# Patient Record
Sex: MALE | Race: WHITE | NOT HISPANIC OR LATINO | Employment: FULL TIME | URBAN - NONMETROPOLITAN AREA
[De-identification: names, ages, dates, MRNs, and addresses within clinical notes are randomized per-mention and may not be internally consistent; named-entity substitution may affect disease eponyms.]

---

## 2017-12-20 ENCOUNTER — TRANSCRIBE ORDERS (OUTPATIENT)
Dept: ORTHOPEDIC SURGERY | Facility: CLINIC | Age: 57
End: 2017-12-20

## 2017-12-20 DIAGNOSIS — M25.562 LEFT KNEE PAIN, UNSPECIFIED CHRONICITY: Primary | ICD-10-CM

## 2017-12-21 DIAGNOSIS — M25.562 LEFT KNEE PAIN, UNSPECIFIED CHRONICITY: Primary | ICD-10-CM

## 2017-12-22 ENCOUNTER — OFFICE VISIT (OUTPATIENT)
Dept: ORTHOPEDIC SURGERY | Facility: CLINIC | Age: 57
End: 2017-12-22

## 2017-12-22 VITALS — WEIGHT: 201 LBS | BODY MASS INDEX: 29.77 KG/M2 | HEIGHT: 69 IN

## 2017-12-22 DIAGNOSIS — G89.29 CHRONIC PAIN OF LEFT KNEE: Primary | ICD-10-CM

## 2017-12-22 DIAGNOSIS — M17.12 PRIMARY OSTEOARTHRITIS OF LEFT KNEE: ICD-10-CM

## 2017-12-22 DIAGNOSIS — M25.562 CHRONIC PAIN OF LEFT KNEE: Primary | ICD-10-CM

## 2017-12-22 PROCEDURE — 99214 OFFICE O/P EST MOD 30 MIN: CPT | Performed by: NURSE PRACTITIONER

## 2017-12-22 PROCEDURE — 20610 DRAIN/INJ JOINT/BURSA W/O US: CPT | Performed by: NURSE PRACTITIONER

## 2017-12-22 RX ORDER — MELOXICAM 15 MG/1
15 TABLET ORAL DAILY
Qty: 30 TABLET | Refills: 1 | Status: SHIPPED | OUTPATIENT
Start: 2017-12-22 | End: 2018-02-13 | Stop reason: SDUPTHER

## 2017-12-22 RX ADMIN — LIDOCAINE HYDROCHLORIDE 2 ML: 20 INJECTION, SOLUTION INFILTRATION; PERINEURAL at 13:47

## 2017-12-22 RX ADMIN — TRIAMCINOLONE ACETONIDE 40 MG: 40 INJECTION, SUSPENSION INTRA-ARTICULAR; INTRAMUSCULAR at 13:47

## 2017-12-22 NOTE — PROGRESS NOTES
"Vincent Mary is a 57 y.o. male   Primary provider:  Candido Bourgeois MD       Chief Complaint   Patient presents with   • Left Knee - Pain       HISTORY OF PRESENT ILLNESS:    HPI Comments: Patient complains of chronic left knee pain for several years that has continued to gradually worsen.  Patient was seen by his primary care physician, Dr. Bourgeois, on 9/19/2017 with x-rays done and an IM injection of steroid given.  Patient reports that the steroid relieved his pain temporarily but the pain has come back.  Denies any previous injury to his knee. Pain is described as intermittent and moderate in severity.  Pain is described as stabbing and aching in nature.  Pain is worse with flexion of his knee, weightbearing, standing and walking.  Pain improves mildly with rest and Ibuprofen.    Pain   This is a chronic problem. The current episode started more than 1 year ago. The problem occurs constantly. The problem has been gradually worsening. Associated symptoms comments: Stabbing, aching. . The symptoms are aggravated by walking and standing. Treatments tried: IM injection.  The treatment provided mild relief.        CONCURRENT MEDICAL HISTORY:    Past Medical History:   Diagnosis Date   • Arthritis    • Hypertension    • Injury of back    • Kidney stone        Allergies   Allergen Reactions   • Percocet [Oxycodone-Acetaminophen]      Patient stated he \"broke out in a sweat\" when given this medication.          Current Outpatient Prescriptions:   •  Cholecalciferol (VITAMIN D3) 5000 UNITS capsule capsule, Take 5,000 Units by mouth Daily., Disp: , Rfl:   •  meloxicam (MOBIC) 15 MG tablet, Take 1 tablet by mouth Daily for 30 days., Disp: 30 tablet, Rfl: 1  •  NIFEdipine XL (PROCARDIA XL) 30 MG 24 hr tablet, Take 30 mg by mouth Daily., Disp: , Rfl:     History reviewed. No pertinent surgical history.    Family History   Problem Relation Age of Onset   • Rheum arthritis Mother    • Heart failure Father    • " "Hypertension Father    • No Known Problems Sister    • No Known Problems Brother    • No Known Problems Son    • No Known Problems Daughter        Social History     Social History   • Marital status:      Spouse name: N/A   • Number of children: N/A   • Years of education: N/A     Occupational History   • Not on file.     Social History Main Topics   • Smoking status: Never Smoker   • Smokeless tobacco: Never Used   • Alcohol use No   • Drug use: No   • Sexual activity: Defer     Other Topics Concern   • Not on file     Social History Narrative        Review of Systems   HENT:        Ringing in ears   Eyes: Positive for visual disturbance.   Musculoskeletal: Positive for gait problem.        Left knee pain.        PHYSICAL EXAMINATION:       Ht 175.3 cm (69\")  Wt 91.2 kg (201 lb)  BMI 29.68 kg/m2    Physical Exam   Constitutional: He is oriented to person, place, and time. Vital signs are normal. He appears well-developed and well-nourished.   HENT:   Head: Normocephalic.   Pulmonary/Chest: Effort normal. No respiratory distress.   Abdominal: Soft. He exhibits no distension.   Musculoskeletal:        Left knee: He exhibits no effusion.   Neurological: He is alert and oriented to person, place, and time. GCS eye subscore is 4. GCS verbal subscore is 5. GCS motor subscore is 6.   Skin: Skin is warm, dry and intact.   Psychiatric: He has a normal mood and affect. His speech is normal and behavior is normal. Judgment and thought content normal. Cognition and memory are normal.   Vitals reviewed.      GAIT:     []  Normal  [x]  Antalgic    Assistive device: [x]  None  []  Walker     []  Crutches  []  Cane     []  Wheelchair  []  Stretcher    Right Knee Exam     Tenderness   The patient is experiencing no tenderness.         Range of Motion   Extension: 0   Flexion: 120     Muscle Strength     The patient has normal right knee strength.    Tests   Mini:  Medial - negative Lateral - negative  Varus: " negative  Valgus: negative    Other   Erythema: absent  Sensation: normal  Pulse: present  Swelling: none      Left Knee Exam     Tenderness   The patient is experiencing tenderness in the medial joint line.    Range of Motion   Extension: 0   Flexion: 120     Muscle Strength     The patient has normal left knee strength.    Tests   Mini:  Medial - negative Lateral - negative  Varus: negative  Valgus: negative    Other   Erythema: absent  Sensation: normal  Pulse: present  Swelling: none  Effusion: no effusion present    Comments:  Pain and crepitus with range of motion.             Left knee X-rays done at [facility] on  9/19/2017     Findings: There is joint space narrowing in the medial compartment with minimal osteophyte formation along the medial tibial plateau.  No joint effusion is noted.  There are no fractures.  Visualized joints are well aligned.    Impression:   Mild changes of osteoarthritis in the medial compartment with no acute bony abnormality.       Large Joint Arthrocentesis  Date/Time: 12/22/2017 1:47 PM  Consent given by: patient  Site marked: site marked  Timeout: Immediately prior to procedure a time out was called to verify the correct patient, procedure, equipment, support staff and site/side marked as required   Supporting Documentation  Indications: pain   Procedure Details  Location: knee - L knee  Preparation: Patient was prepped and draped in the usual sterile fashion  Needle size: 22 G  Approach: anterolateral  Medications administered: 2 mL lidocaine 2%; 40 mg triamcinolone acetonide 40 MG/ML  Patient tolerance: patient tolerated the procedure well with no immediate complications        ASSESSMENT:    Diagnoses and all orders for this visit:    Chronic pain of left knee  -     Large Joint Arthrocentesis    Primary osteoarthritis of left knee  -     Large Joint Arthrocentesis    Other orders  -     meloxicam (MOBIC) 15 MG tablet; Take 1 tablet by mouth Daily for  30 days.      PLAN    X-rays of left knee done at [facility] on 9/19/2017 are reviewed today.  Discussed findings of osteoarthritis and medial joint space narrowing on x-ray.  Patient has had ongoing left knee pain for several years that has gradually worsened.  Recommend intra-articular injection of steroid to the left knee today for pain.  Recommend starting an oral NSAID for arthritic pain.  Meloxicam is prescribed today.  Discussed further conservative treatment options, including Viscosupplementation injections, physical therapy and bracing.  Recommend modified activity and modified weight-bearing as tolerated based on his pain.  Recommend ice therapy intermittently as needed for pain/swelling.  Follow-up in 4 weeks for recheck.  Patient will notify the office if he decides he would like the viscosupplementation injections.    Return in about 4 weeks (around 1/19/2018) for Recheck.      This document has been electronically signed by ANTOINE Shannon on December 26, 2017 4:50 PM      ANTOINE Shannon

## 2017-12-26 RX ORDER — LIDOCAINE HYDROCHLORIDE 20 MG/ML
2 INJECTION, SOLUTION INFILTRATION; PERINEURAL
Status: COMPLETED | OUTPATIENT
Start: 2017-12-22 | End: 2017-12-22

## 2017-12-26 RX ORDER — TRIAMCINOLONE ACETONIDE 40 MG/ML
40 INJECTION, SUSPENSION INTRA-ARTICULAR; INTRAMUSCULAR
Status: COMPLETED | OUTPATIENT
Start: 2017-12-22 | End: 2017-12-22

## 2018-01-19 ENCOUNTER — OFFICE VISIT (OUTPATIENT)
Dept: ORTHOPEDIC SURGERY | Facility: CLINIC | Age: 58
End: 2018-01-19

## 2018-01-19 VITALS — WEIGHT: 202.9 LBS | HEIGHT: 69 IN | BODY MASS INDEX: 30.05 KG/M2

## 2018-01-19 DIAGNOSIS — G89.29 CHRONIC MIDLINE THORACIC BACK PAIN: ICD-10-CM

## 2018-01-19 DIAGNOSIS — M54.50 CHRONIC MIDLINE LOW BACK PAIN WITHOUT SCIATICA: ICD-10-CM

## 2018-01-19 DIAGNOSIS — R07.81 RIB PAIN ON LEFT SIDE: ICD-10-CM

## 2018-01-19 DIAGNOSIS — G89.29 CHRONIC MIDLINE LOW BACK PAIN WITHOUT SCIATICA: ICD-10-CM

## 2018-01-19 DIAGNOSIS — M17.12 PRIMARY OSTEOARTHRITIS OF LEFT KNEE: ICD-10-CM

## 2018-01-19 DIAGNOSIS — G89.29 CHRONIC PAIN OF LEFT KNEE: Primary | ICD-10-CM

## 2018-01-19 DIAGNOSIS — M25.562 CHRONIC PAIN OF LEFT KNEE: Primary | ICD-10-CM

## 2018-01-19 DIAGNOSIS — M54.6 CHRONIC MIDLINE THORACIC BACK PAIN: ICD-10-CM

## 2018-01-19 PROCEDURE — 99214 OFFICE O/P EST MOD 30 MIN: CPT | Performed by: NURSE PRACTITIONER
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
## 2018-01-22 PROBLEM — G89.29 CHRONIC MIDLINE THORACIC BACK PAIN: Status: ACTIVE | Noted: 2018-01-22

## 2018-01-22 PROBLEM — R07.81 RIB PAIN ON LEFT SIDE: Status: ACTIVE | Noted: 2018-01-22

## 2018-01-22 PROBLEM — G89.29 CHRONIC MIDLINE LOW BACK PAIN WITHOUT SCIATICA: Status: ACTIVE | Noted: 2018-01-22

## 2018-01-22 PROBLEM — M54.50 CHRONIC MIDLINE LOW BACK PAIN WITHOUT SCIATICA: Status: ACTIVE | Noted: 2018-01-22

## 2018-01-22 PROBLEM — M54.6 CHRONIC MIDLINE THORACIC BACK PAIN: Status: ACTIVE | Noted: 2018-01-22

## 2018-01-29 ENCOUNTER — APPOINTMENT (OUTPATIENT)
Dept: MRI IMAGING | Facility: HOSPITAL | Age: 58
End: 2018-01-29

## 2018-02-01 ENCOUNTER — HOSPITAL ENCOUNTER (OUTPATIENT)
Dept: MRI IMAGING | Facility: HOSPITAL | Age: 58
Discharge: HOME | End: 2018-02-01
Admitting: NURSE PRACTITIONER

## 2018-02-01 ENCOUNTER — HOSPITAL ENCOUNTER (OUTPATIENT)
Dept: MRI IMAGING | Facility: HOSPITAL | Age: 58
Discharge: HOME | End: 2018-02-01

## 2018-02-01 DIAGNOSIS — M54.6 CHRONIC MIDLINE THORACIC BACK PAIN: ICD-10-CM

## 2018-02-01 DIAGNOSIS — M54.50 CHRONIC MIDLINE LOW BACK PAIN WITHOUT SCIATICA: ICD-10-CM

## 2018-02-01 DIAGNOSIS — G89.29 CHRONIC MIDLINE LOW BACK PAIN WITHOUT SCIATICA: ICD-10-CM

## 2018-02-01 DIAGNOSIS — G89.29 CHRONIC MIDLINE THORACIC BACK PAIN: ICD-10-CM

## 2018-02-01 PROCEDURE — 72146 MRI CHEST SPINE W/O DYE: CPT

## 2018-02-01 PROCEDURE — 72148 MRI LUMBAR SPINE W/O DYE: CPT

## 2018-02-02 ENCOUNTER — OFFICE VISIT (OUTPATIENT)
Dept: ORTHOPEDIC SURGERY | Facility: CLINIC | Age: 58
End: 2018-02-02

## 2018-02-02 VITALS — HEIGHT: 69 IN | WEIGHT: 201.3 LBS | BODY MASS INDEX: 29.82 KG/M2

## 2018-02-02 DIAGNOSIS — R07.81 RIB PAIN ON LEFT SIDE: ICD-10-CM

## 2018-02-02 DIAGNOSIS — G89.29 CHRONIC PAIN OF LEFT KNEE: Primary | ICD-10-CM

## 2018-02-02 DIAGNOSIS — M54.50 CHRONIC MIDLINE LOW BACK PAIN WITHOUT SCIATICA: ICD-10-CM

## 2018-02-02 DIAGNOSIS — G89.29 CHRONIC MIDLINE THORACIC BACK PAIN: ICD-10-CM

## 2018-02-02 DIAGNOSIS — M17.12 PRIMARY OSTEOARTHRITIS OF LEFT KNEE: ICD-10-CM

## 2018-02-02 DIAGNOSIS — G89.29 CHRONIC MIDLINE LOW BACK PAIN WITHOUT SCIATICA: ICD-10-CM

## 2018-02-02 DIAGNOSIS — M54.6 CHRONIC MIDLINE THORACIC BACK PAIN: ICD-10-CM

## 2018-02-02 DIAGNOSIS — M25.562 CHRONIC PAIN OF LEFT KNEE: Primary | ICD-10-CM

## 2018-02-02 PROCEDURE — 20610 DRAIN/INJ JOINT/BURSA W/O US: CPT | Performed by: NURSE PRACTITIONER

## 2018-02-02 PROCEDURE — 99214 OFFICE O/P EST MOD 30 MIN: CPT | Performed by: NURSE PRACTITIONER

## 2018-02-02 NOTE — PROGRESS NOTES
"Vincent Mary is a 57 y.o. male returns for     Chief Complaint   Patient presents with   • Left Knee - Follow-up     Orthovisc #1     • Lumbar Spine - Follow-up     MRI results from 2/1/18   • Thoracic Spine - Follow-up     MRI results from 2/1/18       HISTORY OF PRESENT ILLNESS: Patient presents to office for follow-up of chronic lumbar and thoracic back pain and MRI results of lumbar spine and thoracic spine.  Complaints of back pain are unchanged from prior visit on 1/19/2018.  Patient is also here for his first Orthovisc injection of the left knee for osteoarthritis.    CONCURRENT MEDICAL HISTORY:    Past Medical History:   Diagnosis Date   • Arthritis    • Hypertension    • Injury of back    • Kidney stone        Allergies   Allergen Reactions   • Percocet [Oxycodone-Acetaminophen]      Patient stated he \"broke out in a sweat\" when given this medication.          Current Outpatient Prescriptions:   •  Cholecalciferol (VITAMIN D3) 5000 UNITS capsule capsule, Take 5,000 Units by mouth Daily., Disp: , Rfl:   •  NIFEdipine XL (PROCARDIA XL) 30 MG 24 hr tablet, Take 30 mg by mouth Daily., Disp: , Rfl:     No past surgical history on file.    ROS  No fevers or chills.  No chest pain or shortness of air.  No GI or  disturbances. Left knee pain. Back pain.     PHYSICAL EXAMINATION:       Ht 175.3 cm (69\")  Wt 91.3 kg (201 lb 4.8 oz)  BMI 29.73 kg/m2    Physical Exam   Constitutional: He is oriented to person, place, and time. Vital signs are normal. He appears well-developed and well-nourished.   HENT:   Head: Normocephalic.   Pulmonary/Chest: Effort normal. No respiratory distress.   Abdominal: Soft. He exhibits no distension.   Musculoskeletal:        Right knee: He exhibits no effusion.        Left knee: He exhibits no effusion.   Neurological: He is alert and oriented to person, place, and time. GCS eye subscore is 4. GCS verbal subscore is 5. GCS motor subscore is 6.   Skin: Skin is warm, dry and " intact.   Psychiatric: He has a normal mood and affect. His speech is normal and behavior is normal. Judgment and thought content normal. Cognition and memory are normal.   Vitals reviewed.      GAIT:     [x]  Normal  []  Antalgic    Assistive device: [x]  None  []  Walker     []  Crutches  []  Cane     []  Wheelchair  []  Stretcher    Right Knee Exam     Tenderness   The patient is experiencing no tenderness.         Range of Motion   Extension: 0   Flexion: 120     Muscle Strength     The patient has normal right knee strength.    Tests   Mini:  Medial - negative Lateral - negative  Varus: negative  Valgus: negative    Other   Erythema: absent  Sensation: normal  Swelling: none  Other tests: no effusion present    Comments:  No pain with range of motion.      Left Knee Exam     Tenderness   The patient is experiencing tenderness in the medial joint line (mild).    Range of Motion   Extension: 0   Flexion: 120     Muscle Strength     The patient has normal left knee strength.    Tests   Mini:  Medial - negative Lateral - negative  Varus: negative  Valgus: negative    Other   Erythema: absent  Sensation: normal  Pulse: present  Swelling: none  Effusion: no effusion present    Comments:  No pain with range of motion.       Back Exam     Tenderness   The patient is experiencing tenderness in the lumbar and thoracic (mild).    Range of Motion   The patient has normal back ROM.    Muscle Strength   The patient has normal back strength.    Tests   Straight leg raise right: negative  Straight leg raise left: negative    Other   Sensation: normal  Gait: normal             Xr Ribs Left With Pa Chest    Result Date: 1/19/2018  Narrative: AP view of the chest and 4 views of the left ribs reveal no evidence of fracture.  No significant degenerative changes are noted.  No acute radiologic abnormalities are noted at this time.01/19/18 at 5:34 PM by ANTOINE Shannon     Xr Spine Thoracic 2 View    Result Date:  1/22/2018  Narrative: 3 views of the thoracic spine reveal degenerative changes with decreased vertebral disc spacing at multiple levels throughout the thoracic spine. There is a chronic compression deformity of the T6 vertebral body.  No evidence of acute fracture.  No acute radiologic abnormalities are noted at this time. 01/22/18 at 3:29 PM by ANTOINE Shannon     Xr Spine Lumbar 2 Or 3 View    Result Date: 1/19/2018  Narrative: 2 views of the lumbar spine reveal degenerative changes with decreased vertebral disc spacing noted at L1-L2, T12-L1 and T11-T12.  There are multiple osteophyte formations noted to the anterior spine with the largest osteophytes noted anteriorly at L2 and L3.  No evidence of fracture.  No acute radiologic abnormalities are noted at this time.01/19/18 at 5:20 PM by ANTOINE Shannon     Mri Thoracic Spine Without Contrast    Result Date: 2/1/2018  Narrative: Mid back pain. MR, thoracic spine without intravenous contrast. Comparison is made with study dated March 14, 2006. Examination revealed mild kyphotic deformity of the thoracic spine. The alignment is well maintained. No subluxation is seen. Again noted old compression fracture of T6 vertebral body which lost approximately 20% of its height. There is also very minimal loss of height of T8 vertebral body. There are multiple endplate irregularities. There are large hemangioma is of T2 vertebra on the right, T5 vertebra on the left and T11 vertebra the left. There are also several small hemangiomas of thoracic vertebra. No bone marrow replacement is identified. At T5-T6 there is mild disc bulge/osteophyte complex without cord effacement. No abnormal signal is seen within the spinal cord.     Impression: CONCLUSION: No significant interval change. Stable mild compression fracture of T6 vertebral body and very minimal loss of height of T8 vertebral body. Mild degenerative changes. Electronically signed by:  Jose Hardy MD   2/1/2018 10:55 AM CST Workstation: [id number]    Mri Lumbar Spine Without Contrast    Result Date: 2/1/2018  Narrative: Procedure: MR lumbar spine without contrast Reason for exam: Abnormal x-ray, lumbar spine, DJD. Chronic mid lower back pain. FINDINGS: Multisequence multiplanar MR imaging of the lumbar spine was performed without contrast. The lumbar spine vertebral body heights and alignment are normal. There is no evidence of fracture or dislocation. L4 and L3 small circular subcentimeter high signal lesions on T1 and T2 weighting consistent with benign hemangiomas. No abnormal marrow signal. The conus of the spinal cord appears normal with tip at the T12 vertebral body level. T12-L1: Disc space normal. No disc protrusion or extrusion. Nerve roots exit without compromise. L1-L2: Disc space normal. No disc protrusion or extrusion. Nerve roots exit without compromise. L2-L3: Diffuse disc mildly low signal on T2 weighting suggesting mild desiccation from degenerative disc disease. No disc protrusion or extrusion. Nerve roots exit without compromise. L3-L4: Disc space normal. No disc protrusion or extrusion. Nerve roots exit without compromise. L4-L5: Disc space normal. No disc protrusion or extrusion. Nerve roots exit without compromise. L5-S1: Disc space normal. No disc protrusion or extrusion. Nerve roots exit without compromise.     Impression: 1.  No acute MR lumbar spine abnormality. 2.  L2-L3 mild degenerative disc disease. Electronically signed by:  Sylvester Hudson MD  2/1/2018 12:07 PM [facility] Workstation: [id number]        Large Joint Arthrocentesis  Date/Time: 2/2/2018 9:56 AM  Consent given by: patient  Site marked: site marked  Timeout: Immediately prior to procedure a time out was called to verify the correct patient, procedure, equipment, support staff and site/side marked as required   Supporting Documentation  Indications: pain   Procedure Details  Location: knee - L knee  Preparation: Patient was prepped  and draped in the usual sterile fashion  Needle size: 22 G  Approach: lateral  Medications administered: 30 mg Hyaluronan 30 MG/2ML  Patient tolerance: patient tolerated the procedure well with no immediate complications            ASSESSMENT:    Diagnoses and all orders for this visit:    Chronic pain of left knee  -     Large Joint Arthrocentesis    Primary osteoarthritis of left knee  -     Large Joint Arthrocentesis    Chronic midline low back pain without sciatica    Rib pain on left side    Chronic midline thoracic back pain      PLAN    MRI images of thoracic spine and lumbar spine are reviewed and results discussed with patient.  Discussed findings of multiple hemangiomas noted in thoracic spine with a largest hemangiomas being at T2, T5 and T11. These are described as benign hemangiomas per radiology reading and do not appear to take up abnormal signal on MRI images reviewed. However, recommend bone scan and labs to further rule out malignancy.  Patient is requesting to do his lab work at his work site at [employer].  Written orders are provided to patient today to take with him for labs.  Patient is aware that if they are unable to perform the labs at his work to let me know and we will enter the orders at [facility].  Chronic thoracic and lumbar spine pain are described as mild and chronic.  Patient describes increased pain and fatigue in the back with longer periods of standing and sitting.  Discussed with patient recommendation for physical therapy for back strengthening and conditioning.  Patient declines physical therapy at this time and wants to wait for the labs and bone scan reports.  Left knee pain related to osteoarthritis is described as mild today.  First Orthovisc injection given today.  Follow-up in one week for repeat Orthovisc injection and lab results.    Return in about 1 week (around 2/9/2018) for for Orthovisc injection #2.      This document has been electronically signed by Leila YAN  ANTOINE Barbosa on February 2, 2018 11:35 AM      ANTOINE Shannon

## 2018-02-09 ENCOUNTER — CLINICAL SUPPORT (OUTPATIENT)
Dept: ORTHOPEDIC SURGERY | Facility: CLINIC | Age: 58
End: 2018-02-09

## 2018-02-09 DIAGNOSIS — G89.29 CHRONIC MIDLINE LOW BACK PAIN WITHOUT SCIATICA: ICD-10-CM

## 2018-02-09 DIAGNOSIS — G89.29 CHRONIC MIDLINE THORACIC BACK PAIN: ICD-10-CM

## 2018-02-09 DIAGNOSIS — G89.29 CHRONIC MIDLINE LOW BACK PAIN WITH LEFT-SIDED SCIATICA: ICD-10-CM

## 2018-02-09 DIAGNOSIS — M54.50 CHRONIC MIDLINE LOW BACK PAIN WITHOUT SCIATICA: ICD-10-CM

## 2018-02-09 DIAGNOSIS — G89.29 CHRONIC PAIN OF LEFT KNEE: Primary | ICD-10-CM

## 2018-02-09 DIAGNOSIS — M54.6 CHRONIC MIDLINE THORACIC BACK PAIN: ICD-10-CM

## 2018-02-09 DIAGNOSIS — M17.12 PRIMARY OSTEOARTHRITIS OF LEFT KNEE: ICD-10-CM

## 2018-02-09 DIAGNOSIS — D18.09 HEMANGIOMA OF SPINE: ICD-10-CM

## 2018-02-09 DIAGNOSIS — M54.42 CHRONIC MIDLINE LOW BACK PAIN WITH LEFT-SIDED SCIATICA: ICD-10-CM

## 2018-02-09 DIAGNOSIS — M25.562 CHRONIC PAIN OF LEFT KNEE: Primary | ICD-10-CM

## 2018-02-09 PROCEDURE — 20610 DRAIN/INJ JOINT/BURSA W/O US: CPT | Performed by: NURSE PRACTITIONER

## 2018-02-09 RX ORDER — TRAMADOL HYDROCHLORIDE 50 MG/1
50 TABLET ORAL EVERY 8 HOURS PRN
Qty: 40 TABLET | Refills: 0
Start: 2018-02-09 | End: 2018-02-19

## 2018-02-09 NOTE — PROGRESS NOTES
Knee Joint Injection      Patient: Vincent Mary    YOB: 1960    Chief Complaint   Patient presents with   • Left Knee - Injections       History of Present Illness:  Patient is here for a left knee injection. Continues to have left knee pain. Continues to complain of low back pain that radiates to his left hip. States his back pain and left hip pain wake him up several times throughout the night.     Physical Exam: 57 y.o. male  General Appearance:    Alert, cooperative, in no acute distress                   There were no vitals filed for this visit.     Patient is alert and oriented, ×3 no acute distress.  Affect is normal respiratory rate is normal unlabored.  Exam and complaints are unchanged.    Procedure:  Large Joint Arthrocentesis  Date/Time: 2/9/2018 3:57 PM  Consent given by: patient  Site marked: site marked  Timeout: Immediately prior to procedure a time out was called to verify the correct patient, procedure, equipment, support staff and site/side marked as required   Supporting Documentation  Indications: pain   Procedure Details  Location: knee - L knee  Preparation: Patient was prepped and draped in the usual sterile fashion  Needle size: 22 G  Approach: anterolateral  Medications administered: 30 mg Hyaluronan 30 MG/2ML  Patient tolerance: patient tolerated the procedure well with no immediate complications        Assessment:    Diagnoses and all orders for this visit:    Chronic pain of left knee  -     Large Joint Arthrocentesis    Primary osteoarthritis of left knee  -     Large Joint Arthrocentesis    Hemangioma of spine  -     NM Bone Scan Multiple; Future    Chronic midline thoracic back pain  -     NM Bone Scan Multiple; Future    Chronic midline low back pain without sciatica  -     NM Bone Scan Multiple; Future    Chronic midline low back pain with left-sided sciatica    Other orders  -     traMADol (ULTRAM) 50 MG tablet; Take 1 tablet by mouth Every 8 (Eight) Hours As  Needed for Moderate Pain  for up to 10 days.      Plan:   Slowly increase activity as tolerated.  Discussed importance of leg strengthening and general conditioning.  Discussed warning signs of injection.  Discussed that purpose of injections was symptom improvement and improved activity.  Also discussed that further treatment options depended on symptoms at followup and length of time of improvement after injections. Patient complains of continued low back pain with pain that radiates to his left hip and leg.  Patient reports that this pain awakens him from sleep several times throughout the night.  Tramadol is prescribed today for pain.  Prescription per ANTOINE Cid.  Labs and bone scan pending.       This document has been electronically signed by ANTOINE Shannon on February 9, 2018 4:22 PM      Return in about 1 week (around 2/16/2018) for for Orthovisc #3.      This document has been electronically signed by ANTOINE Shannon on February 9, 2018 4:22 PM      ANTOINE Shannon

## 2018-02-13 RX ORDER — MELOXICAM 15 MG/1
TABLET ORAL
Qty: 30 TABLET | Refills: 1 | Status: SHIPPED | OUTPATIENT
Start: 2018-02-13 | End: 2018-04-09 | Stop reason: SDUPTHER

## 2018-02-16 ENCOUNTER — CLINICAL SUPPORT (OUTPATIENT)
Dept: ORTHOPEDIC SURGERY | Facility: CLINIC | Age: 58
End: 2018-02-16

## 2018-02-16 VITALS — WEIGHT: 201 LBS | HEIGHT: 69 IN | BODY MASS INDEX: 29.77 KG/M2

## 2018-02-16 DIAGNOSIS — M17.12 PRIMARY OSTEOARTHRITIS OF LEFT KNEE: ICD-10-CM

## 2018-02-16 DIAGNOSIS — M25.562 CHRONIC PAIN OF LEFT KNEE: Primary | ICD-10-CM

## 2018-02-16 DIAGNOSIS — G89.29 CHRONIC PAIN OF LEFT KNEE: Primary | ICD-10-CM

## 2018-02-16 PROCEDURE — 20610 DRAIN/INJ JOINT/BURSA W/O US: CPT | Performed by: NURSE PRACTITIONER

## 2018-02-16 NOTE — PROGRESS NOTES
"Knee Joint Injection      Patient: Vincent Mary    YOB: 1960    Chief Complaint   Patient presents with   • Left Knee - Injections     #3       History of Present Illness:  Patient is here for Orthovisc injection to left knee.  No new complaints.    Physical Exam: 57 y.o. male  General Appearance:    Alert, cooperative, in no acute distress                   Vitals:    02/16/18 1529   Weight: 91.2 kg (201 lb)   Height: 175.3 cm (69\")        Patient is alert and oriented ×3. No acute distress.  Affect is normal respiratory rate is normal unlabored.  Exam and complaints are unchanged. Patient continues to complain of chronic back pain and left knee pain. Copies of lab reports provided to patient today. Bone scan is scheduled.     Procedure:  Large Joint Arthrocentesis  Date/Time: 2/16/2018 3:30 PM  Consent given by: patient  Site marked: site marked  Timeout: Immediately prior to procedure a time out was called to verify the correct patient, procedure, equipment, support staff and site/side marked as required   Supporting Documentation  Indications: pain   Procedure Details  Location: knee - L knee  Preparation: Patient was prepped and draped in the usual sterile fashion  Needle size: 22 G  Approach: anterolateral  Medications administered: 30 mg Hyaluronan 30 MG/2ML  Patient tolerance: patient tolerated the procedure well with no immediate complications        Assessment:    Diagnoses and all orders for this visit:    Chronic pain of left knee    Primary osteoarthritis of left knee  -     Large Joint Arthrocentesis      Plan:   Slowly increase activity as tolerated.  Discussed importance of leg strengthening and general conditioning.  Discussed warning signs of injection.  Discussed that purpose of injections was symptom improvement and improved activity.  Also discussed that further treatment options depended on symptoms at followup and length of time of improvement after injections.    Return " in about 1 week (around 2/23/2018), or for Orthovisc #4.      This document has been electronically signed by ANTOINE Shannon on February 16, 2018 4:02 PM      ANTOINE Shannon

## 2018-02-20 ENCOUNTER — HOSPITAL ENCOUNTER (OUTPATIENT)
Dept: NUCLEAR MEDICINE | Facility: HOSPITAL | Age: 58
Discharge: HOME | End: 2018-02-20

## 2018-02-20 DIAGNOSIS — G89.29 CHRONIC MIDLINE LOW BACK PAIN WITHOUT SCIATICA: ICD-10-CM

## 2018-02-20 DIAGNOSIS — M54.6 CHRONIC MIDLINE THORACIC BACK PAIN: ICD-10-CM

## 2018-02-20 DIAGNOSIS — G89.29 CHRONIC MIDLINE THORACIC BACK PAIN: ICD-10-CM

## 2018-02-20 DIAGNOSIS — D18.09 HEMANGIOMA OF SPINE: ICD-10-CM

## 2018-02-20 DIAGNOSIS — M54.50 CHRONIC MIDLINE LOW BACK PAIN WITHOUT SCIATICA: ICD-10-CM

## 2018-02-20 DIAGNOSIS — M25.569 KNEE PAIN: ICD-10-CM

## 2018-02-20 PROCEDURE — 78306 BONE IMAGING WHOLE BODY: CPT

## 2018-02-20 PROCEDURE — A9503 TC99M MEDRONATE: HCPCS | Performed by: NURSE PRACTITIONER

## 2018-02-20 PROCEDURE — 0 TECHNETIUM MEDRONATE KIT: Performed by: NURSE PRACTITIONER

## 2018-02-20 RX ORDER — TC 99M MEDRONATE 20 MG/10ML
25.9 INJECTION, POWDER, LYOPHILIZED, FOR SOLUTION INTRAVENOUS
Status: COMPLETED | OUTPATIENT
Start: 2018-02-20 | End: 2018-02-20

## 2018-02-20 RX ADMIN — Medication 25.9 MILLICURIE: at 09:46

## 2018-02-23 ENCOUNTER — CLINICAL SUPPORT (OUTPATIENT)
Dept: ORTHOPEDIC SURGERY | Facility: CLINIC | Age: 58
End: 2018-02-23

## 2018-02-23 DIAGNOSIS — M17.12 PRIMARY OSTEOARTHRITIS OF LEFT KNEE: ICD-10-CM

## 2018-02-23 DIAGNOSIS — M25.562 CHRONIC PAIN OF LEFT KNEE: Primary | ICD-10-CM

## 2018-02-23 DIAGNOSIS — G89.29 CHRONIC PAIN OF LEFT KNEE: Primary | ICD-10-CM

## 2018-02-23 PROCEDURE — 20610 DRAIN/INJ JOINT/BURSA W/O US: CPT | Performed by: NURSE PRACTITIONER

## 2018-02-23 NOTE — PROGRESS NOTES
Knee Joint Injection      Patient: Vincent Mary    YOB: 1960    Chief Complaint   Patient presents with   • Left Knee - Injections       History of Present Illness:  Patient is here for a left knee injection, Orthovisc #4.  No new complaints.    Physical Exam: 57 y.o. male  General Appearance:    Alert, cooperative, in no acute distress                   There were no vitals filed for this visit.     Patient is alert and oriented ×3. No acute distress.  Affect is normal. Respiratory rate is normal, unlabored.  Exam and complaints are unchanged.    Procedure:  Large Joint Arthrocentesis  Date/Time: 2/23/2018 9:48 AM  Consent given by: patient  Site marked: site marked  Timeout: Immediately prior to procedure a time out was called to verify the correct patient, procedure, equipment, support staff and site/side marked as required   Supporting Documentation  Indications: pain and diagnostic evaluation   Procedure Details  Location: knee - L knee  Preparation: Patient was prepped and draped in the usual sterile fashion  Needle size: 22 G  Approach: anterolateral  Medications administered: 30 mg Hyaluronan 30 MG/2ML  Patient tolerance: patient tolerated the procedure well with no immediate complications        Assessment:    Diagnoses and all orders for this visit:    Chronic pain of left knee  -     Large Joint Arthrocentesis    Primary osteoarthritis of left knee  -     Large Joint Arthrocentesis      Plan:   Slowly increase activity as tolerated.  Discussed importance of leg strengthening and general conditioning.  Discussed warning signs of injection.  Discussed that purpose of injections was symptom improvement and improved activity.  Also discussed that further treatment options depended on symptoms at followup and length of time of improvement after injections. Recommend modified activity and modified weight-bearing as tolerated and based on pain.  Recommend to continue Meloxicam, as  previously prescribed.  Patient continues to complain of chronic back pain and left hip pain that awaken him at night.  Patient has not yet tried the Tramadol for pain due to his work/sleep scheduled but plans to start it this weekend.  Follow-up in 6 weeks for recheck of left knee pain and back pain.    Return in about 6 weeks (around 4/6/2018) for Recheck.      This document has been electronically signed by ANTOINE Shannon on February 23, 2018 10:34 AM      ANTOINE Shannon

## 2018-04-06 ENCOUNTER — OFFICE VISIT (OUTPATIENT)
Dept: ORTHOPEDIC SURGERY | Facility: CLINIC | Age: 58
End: 2018-04-06

## 2018-04-06 VITALS — WEIGHT: 207 LBS | HEIGHT: 69 IN | BODY MASS INDEX: 30.66 KG/M2

## 2018-04-06 DIAGNOSIS — M25.562 CHRONIC PAIN OF LEFT KNEE: Primary | ICD-10-CM

## 2018-04-06 DIAGNOSIS — M23.92 INTERNAL DERANGEMENT OF LEFT KNEE: ICD-10-CM

## 2018-04-06 DIAGNOSIS — G89.29 CHRONIC PAIN OF LEFT KNEE: Primary | ICD-10-CM

## 2018-04-06 DIAGNOSIS — M17.12 PRIMARY OSTEOARTHRITIS OF LEFT KNEE: ICD-10-CM

## 2018-04-06 PROCEDURE — 99214 OFFICE O/P EST MOD 30 MIN: CPT | Performed by: NURSE PRACTITIONER

## 2018-04-06 RX ORDER — LIDOCAINE 50 MG/G
PATCH TOPICAL
Refills: 2 | COMMUNITY
Start: 2018-03-16 | End: 2018-04-19

## 2018-04-06 NOTE — PROGRESS NOTES
"Vincent Mary is a 57 y.o. male returns for     Chief Complaint   Patient presents with   • Left Knee - Follow-up       HISTORY OF PRESENT ILLNESS: Patient presents to office for follow-up of chronic left knee pain.  Left knee pain has been present for several years but continues gradually worsened.  Patient has received intra-articular injection of steroid, which improved his pain temporarily.  Patient also received Orthovisc series in his left knee, which did not help his pain at all.  Patient continues to take meloxicam daily.  Patient primarily complains of aching-type pain in the medial aspect of his left knee, worse with weightbearing and activity.  Patient also complains of catching sharp pains intermittently with pivoting and certain movements.  Patient also complains of continued chronic thoracic and lumbar pain, with difficulty sleeping at times due to pain.  Patient has not yet tried the Tramadol that was prescribed.    CONCURRENT MEDICAL HISTORY:    Past Medical History:   Diagnosis Date   • Arthritis    • Hypertension    • Injury of back    • Kidney stone        Allergies   Allergen Reactions   • Percocet [Oxycodone-Acetaminophen]      Patient stated he \"broke out in a sweat\" when given this medication.          Current Outpatient Prescriptions:   •  Cholecalciferol (VITAMIN D3) 5000 UNITS capsule capsule, Take 5,000 Units by mouth Daily., Disp: , Rfl:   •  lidocaine (LIDODERM) 5 %, USE AS DIRECTED PER PACKAGE INSTRUCTIONS. DO NOT USE MORE THAN 1 PATCH EVERY 12 TO 24 HOURS., Disp: , Rfl: 2  •  meloxicam (MOBIC) 15 MG tablet, TAKE 1 TABLET BY MOUTH DAILY FOR 30 DAYS., Disp: 30 tablet, Rfl: 1  •  NIFEdipine XL (PROCARDIA XL) 30 MG 24 hr tablet, Take 30 mg by mouth Daily., Disp: , Rfl:     No past surgical history on file.    ROS  No fevers or chills.  No chest pain or shortness of air.  No GI or  disturbances. Back pain. Left knee pain.     PHYSICAL EXAMINATION:       Ht 175.3 cm (69\")   Wt 93.9 " kg (207 lb)   BMI 30.57 kg/m²     Physical Exam   Constitutional: He is oriented to person, place, and time. Vital signs are normal. He appears well-developed and well-nourished. He is active and cooperative. He does not appear ill. No distress.   HENT:   Head: Normocephalic.   Pulmonary/Chest: Effort normal. No respiratory distress.   Abdominal: Soft. He exhibits no distension.   Musculoskeletal: He exhibits edema (Left knee, mild) and tenderness (Mild, left knee). He exhibits no deformity.        Right knee: He exhibits no effusion.        Left knee: He exhibits no effusion.   Neurological: He is alert and oriented to person, place, and time. GCS eye subscore is 4. GCS verbal subscore is 5. GCS motor subscore is 6.   Skin: Skin is warm, dry and intact. Capillary refill takes less than 2 seconds.   Psychiatric: He has a normal mood and affect. His speech is normal and behavior is normal. Judgment and thought content normal. Cognition and memory are normal.   Vitals reviewed.      GAIT:     [x]  Normal  []  Antalgic    Assistive device: [x]  None  []  Walker     []  Crutches  []  Cane     []  Wheelchair  []  Stretcher    Right Knee Exam     Tenderness   The patient is experiencing no tenderness.         Range of Motion   Extension: 0   Flexion: 130     Muscle Strength     The patient has normal right knee strength.    Tests   Mini:  Medial - negative Lateral - negative  Varus: negative  Valgus: negative    Other   Erythema: absent  Sensation: normal  Pulse: present  Swelling: none  Other tests: no effusion present      Left Knee Exam     Tenderness   The patient is experiencing tenderness in the medial joint line (Mild).    Range of Motion   Extension: 0   Flexion: 130     Muscle Strength     The patient has normal left knee strength.    Tests   Mini:  Medial - positive Lateral - negative  Varus: negative  Valgus: negative    Other   Erythema: absent  Sensation: normal  Pulse: present  Swelling:  mild  Effusion: no effusion present    Comments:  Mild pain with range of motion.             Left knee X[facility] done at [facility] on  9/19/2017      Findings: There is joint space narrowing in the medial compartment with minimal osteophyte formation along the medial tibial plateau.  No joint effusion is noted.  There are no fractures.  Visualized joints are well aligned.     Impression:   Mild changes of osteoarthritis in the medial compartment with no acute bony abnormality.       ASSESSMENT:    Diagnoses and all orders for this visit:    Chronic pain of left knee  -     MRI Knee Left Without Contrast; Future    Primary osteoarthritis of left knee  -     MRI Knee Left Without Contrast; Future    Internal derangement of left knee  -     MRI Knee Left Without Contrast; Future    PLAN    Patient continues to complain of chronic left knee pain that is gradually worsening.  Patient has had an intra-articular injection of the left knee given in December 2017 and has completed the Orthovisc series with no improvement.  In addition to aching-type pain, patient is also having catching and locking symptoms with pivoting.  Recommend MRI of left knee to evaluate for ligament injury and/or meniscus injury.  Offered repeat intra-articular injection of steroid today for pain, but patient declines and states that he wants to wait until the MRI is done.  Recommend to continue Meloxicam daily, as previously prescribed.  Patient also has Tramadol as needed for moderate to severe pain.  He has not yet tried any Tramadol for his back or knee pain as he does not like to take pain medication.  Recommend modified activity and modified weight-bearing as tolerated based on his pain.  Recommend ice therapy to the left knee intermittently as needed for pain/swelling.  Follow-up after MRI.    Return for after MRI.      This document has been electronically signed by ANTOINE Shannon on April 9, 2018 12:38 PM      Leila YAN  Guess, APRN

## 2018-04-09 PROBLEM — M23.92 INTERNAL DERANGEMENT OF LEFT KNEE: Status: ACTIVE | Noted: 2018-04-09

## 2018-04-09 RX ORDER — MELOXICAM 15 MG/1
TABLET ORAL
Qty: 30 TABLET | Refills: 1 | Status: SHIPPED | OUTPATIENT
Start: 2018-04-09 | End: 2018-06-07 | Stop reason: SDUPTHER

## 2018-04-17 ENCOUNTER — HOSPITAL ENCOUNTER (OUTPATIENT)
Dept: MRI IMAGING | Facility: HOSPITAL | Age: 58
Discharge: HOME | End: 2018-04-17
Admitting: NURSE PRACTITIONER

## 2018-04-17 DIAGNOSIS — M23.92 INTERNAL DERANGEMENT OF LEFT KNEE: ICD-10-CM

## 2018-04-17 DIAGNOSIS — M25.562 CHRONIC PAIN OF LEFT KNEE: ICD-10-CM

## 2018-04-17 DIAGNOSIS — G89.29 CHRONIC PAIN OF LEFT KNEE: ICD-10-CM

## 2018-04-17 DIAGNOSIS — M17.12 PRIMARY OSTEOARTHRITIS OF LEFT KNEE: ICD-10-CM

## 2018-04-17 PROCEDURE — 73721 MRI JNT OF LWR EXTRE W/O DYE: CPT

## 2018-04-19 ENCOUNTER — OFFICE VISIT (OUTPATIENT)
Dept: ORTHOPEDIC SURGERY | Facility: CLINIC | Age: 58
End: 2018-04-19

## 2018-04-19 VITALS — HEIGHT: 69 IN | WEIGHT: 206 LBS | BODY MASS INDEX: 30.51 KG/M2

## 2018-04-19 DIAGNOSIS — M25.562 CHRONIC PAIN OF LEFT KNEE: Primary | ICD-10-CM

## 2018-04-19 DIAGNOSIS — M17.12 PRIMARY OSTEOARTHRITIS OF LEFT KNEE: ICD-10-CM

## 2018-04-19 DIAGNOSIS — M94.262 CHONDROMALACIA OF LEFT KNEE: ICD-10-CM

## 2018-04-19 DIAGNOSIS — M23.92 INTERNAL DERANGEMENT OF LEFT KNEE: ICD-10-CM

## 2018-04-19 DIAGNOSIS — S83.242A ACUTE MEDIAL MENISCUS TEAR OF LEFT KNEE, INITIAL ENCOUNTER: ICD-10-CM

## 2018-04-19 DIAGNOSIS — G89.29 CHRONIC PAIN OF LEFT KNEE: Primary | ICD-10-CM

## 2018-04-19 PROCEDURE — 99214 OFFICE O/P EST MOD 30 MIN: CPT | Performed by: NURSE PRACTITIONER

## 2018-04-19 NOTE — PROGRESS NOTES
"Vincent Mary is a 57 y.o. male returns for     Chief Complaint   Patient presents with   • Left Knee - Follow-up     MRI [facility] 4/17/18       HISTORY OF PRESENT ILLNESS: Patient presents to office for follow up of left knee pain and MRI results of left knee. Left knee pain has been present for several years but worsened in the Fall of 2017. Patient does not recall a specific injury. Patient has tried oral NSAIDs, intra-articular injection of steroid and has completed the Orthovisc series without improvement in his left knee pain. Patient primarily complains of aching-type pain in the medial aspect of his left knee, worse with weightbearing and activity.  Patient also complains of catching, sharp pains intermittently with pivoting and certain movements.      CONCURRENT MEDICAL HISTORY:    Past Medical History:   Diagnosis Date   • Arthritis    • Hypertension    • Injury of back    • Kidney stone        Allergies   Allergen Reactions   • Percocet [Oxycodone-Acetaminophen]      Patient stated he \"broke out in a sweat\" when given this medication.          Current Outpatient Prescriptions:   •  Cholecalciferol (VITAMIN D3) 5000 UNITS capsule capsule, Take 5,000 Units by mouth Daily., Disp: , Rfl:   •  meloxicam (MOBIC) 15 MG tablet, TAKE 1 TABLET BY MOUTH DAILY FOR 30 DAYS., Disp: 30 tablet, Rfl: 1  •  NIFEdipine XL (PROCARDIA XL) 30 MG 24 hr tablet, Take 30 mg by mouth Daily., Disp: , Rfl:     No past surgical history on file.    ROS  No fevers or chills.  No chest pain or shortness of air.  No GI or  disturbances. Left knee pain.     PHYSICAL EXAMINATION:       Ht 175.3 cm (69\")   Wt 93.4 kg (206 lb)   BMI 30.42 kg/m²     Physical Exam   Constitutional: He is oriented to person, place, and time. Vital signs are normal. He appears well-developed and well-nourished. He is active and cooperative. He does not appear ill. No distress.   HENT:   Head: Normocephalic.   Pulmonary/Chest: Effort normal. No " respiratory distress.   Abdominal: Soft. He exhibits no distension.   Musculoskeletal: He exhibits tenderness (Left knee, medial aspect). He exhibits no edema or deformity.        Right knee: He exhibits no effusion.        Left knee: He exhibits no effusion.   Neurological: He is alert and oriented to person, place, and time. GCS eye subscore is 4. GCS verbal subscore is 5. GCS motor subscore is 6.   Skin: Skin is warm, dry and intact. Capillary refill takes less than 2 seconds.   Psychiatric: He has a normal mood and affect. His speech is normal and behavior is normal. Judgment and thought content normal. Cognition and memory are normal.   Vitals reviewed.      GAIT:     [x]  Normal  []  Antalgic    Assistive device: [x]  None  []  Walker     []  Crutches  []  Cane     []  Wheelchair  []  Stretcher    Right Knee Exam     Tenderness   The patient is experiencing no tenderness.         Range of Motion   Extension: 0   Flexion: 130     Muscle Strength     The patient has normal right knee strength.    Tests   Mini:  Medial - negative Lateral - negative  Drawer:       Anterior - negative      Varus: negative  Valgus: negative    Other   Erythema: absent  Sensation: normal  Pulse: present  Swelling: none  Other tests: no effusion present      Left Knee Exam     Tenderness   The patient is experiencing tenderness in the medial joint line (Mild).    Range of Motion   Extension: 0   Flexion: 130     Tests   Mini:  Medial - positive Lateral - negative  Drawer:       Anterior - negative       Varus: negative  Valgus: negative    Other   Erythema: absent  Sensation: normal  Pulse: present  Swelling: mild  Effusion: no effusion present    Comments:  Mild pain with range of motion.             Mri Knee Left Without Contrast    Result Date: 4/17/2018  Narrative: Procedure: MR left knee without contrast Reason for exam: Left knee pain FINDINGS: Multisequence multiplanar MR imaging of the left knee was performed without  contrast. Imaging of the bony structures reveals anterior medial tibial plateau small focus of abnormal high marrow signal on T2 weighting which is lower signal on T1-weighting most consistent with small bone contusion this region. Otherwise bony structures of the left knee are unremarkable. Full-thickness chondromalacia along the medial femoral condyle and medial tibial plateau. No other focus of chondromalacia. Small knee joint and suprapatellar joint effusion. The quadriceps femoris and patella tendon are intact. The anterior cruciate ligament is intact. The posterior cruciate ligament is intact. The medial collateral ligament is intact. The lateral collateral ligament is intact. Medial meniscus anterior and posterior horn oblique linear foci of abnormal signal which extend to the articular joint surface consistent with meniscal tears in this region. The lateral meniscus is intact.     Impression: 1.  Medial meniscus anterior and posterior horn oblique linear tears. 2.  Anterior medial tibial plateau abnormal marrow signal as described above suspicious for small focus of bone contusion. 3.  Full-thickness chondromalacia along the medial femoral condyle and medial tibial plateau. Most likely secondary to osteoarthritic changes. 4.  Small knee joint and suprapatellar effusions. 5.  Otherwise unremarkable MR left knee. Electronically signed by:  Sylvester Hudson MD  4/17/2018 4:24 PM CDT Workstation: [id number]    Left knee X-rays done at [facility] on  9/19/2017      Findings: There is joint space narrowing in the medial compartment with minimal osteophyte formation along the medial tibial plateau.  No joint effusion is noted.  There are no fractures.  Visualized joints are well aligned.     Impression:   Mild changes of osteoarthritis in the medial compartment with no acute bony abnormality.     ASSESSMENT:    Diagnoses and all orders for this visit:    Chronic pain of left knee    Primary osteoarthritis  of left knee    Internal derangement of left knee    Chondromalacia of left knee    Acute medial meniscus tear of left knee, initial encounter    PLAN    MRI of left knee reviewed and results discussed with patient. Patient has had increased left knee pain since around September 2017 with no known injury. Patient has been seeing orthopedics for his left knee since 12/22/2017 with little improvement despite multiple conservative treatment efforts including Meloxicam, rest and activity modification, home exercises, intra-articular injection of steroid and Orthovisc series. Patient continues to have pain to the medial aspect of his left knee. Offered repeat intra-articular injection of steroid today but patient declines. Recommend to continue Meloxicam daily, as previously prescribed.  Patient also has Tramadol as needed for moderate to severe pain.  He has not yet tried any Tramadol for his back or knee pain as he does not like to take pain medication.  Recommend modified activity and modified weight-bearing as tolerated and based on his pain.  Recommend ice therapy to the left knee intermittently as needed for pain/swelling. Discussed follow up with Dr. Link for possible surgical consult regarding meniscal tear. Patient states he would like to talk it over with his spouse first.     Return in about 4 weeks (around 5/17/2018) for Recheck with Dr. Link.      This document has been electronically signed by ANTOINE Shannon on April 23, 2018 10:57 AM      ANTOINE Shannon

## 2018-04-23 PROBLEM — S83.242A ACUTE MEDIAL MENISCUS TEAR OF LEFT KNEE: Status: ACTIVE | Noted: 2018-04-23

## 2018-04-23 PROBLEM — M94.262 CHONDROMALACIA OF LEFT KNEE: Status: ACTIVE | Noted: 2018-04-23

## 2018-06-07 RX ORDER — MELOXICAM 15 MG/1
TABLET ORAL
Qty: 30 TABLET | Refills: 1 | Status: SHIPPED | OUTPATIENT
Start: 2018-06-07 | End: 2018-08-10 | Stop reason: SDUPTHER

## 2018-06-18 DIAGNOSIS — M17.12 PRIMARY OSTEOARTHRITIS OF LEFT KNEE: Primary | ICD-10-CM

## 2018-06-19 ENCOUNTER — OFFICE VISIT (OUTPATIENT)
Dept: ORTHOPEDIC SURGERY | Facility: CLINIC | Age: 58
End: 2018-06-19

## 2018-06-19 VITALS — BODY MASS INDEX: 30.51 KG/M2 | WEIGHT: 206 LBS | HEIGHT: 69 IN

## 2018-06-19 DIAGNOSIS — G89.29 CHRONIC PAIN OF LEFT KNEE: ICD-10-CM

## 2018-06-19 DIAGNOSIS — M17.12 PRIMARY OSTEOARTHRITIS OF LEFT KNEE: Primary | ICD-10-CM

## 2018-06-19 DIAGNOSIS — M94.262 CHONDROMALACIA OF LEFT KNEE: ICD-10-CM

## 2018-06-19 DIAGNOSIS — M23.92 INTERNAL DERANGEMENT OF LEFT KNEE: ICD-10-CM

## 2018-06-19 DIAGNOSIS — S83.242D ACUTE MEDIAL MENISCUS TEAR OF LEFT KNEE, SUBSEQUENT ENCOUNTER: ICD-10-CM

## 2018-06-19 DIAGNOSIS — M25.562 CHRONIC PAIN OF LEFT KNEE: ICD-10-CM

## 2018-06-19 PROCEDURE — 99214 OFFICE O/P EST MOD 30 MIN: CPT | Performed by: NURSE PRACTITIONER

## 2018-06-19 NOTE — PROGRESS NOTES
"Vincent Mary is a 57 y.o. male returns for     Chief Complaint   Patient presents with   • Left Knee - Follow-up       HISTORY OF PRESENT ILLNESS: Patient presents to office for follow up of left knee pain. Patient has had chronic left knee pain for several years but it significantly worsened in the Fall of 2017 with no known injury or incident. MRI done on 4/17/2018 was positive for medial meniscus tears with stress reaction in the medial tibial plateau as well as degenerative changes in the left knee joint. Patient continues to have left knee pain that has not improved with multiple conservative treatment efforts including consistent dosing of oral NSAIDs, intra-articular injection of steroid, Orthovisc series, rest/activity modification and a home exercise program. Patient wants to proceed with possible surgical intervention to help with his knee pain. Patient primarily complains of aching-type pain in the medial aspect of his left knee, worse with weightbearing and activity.  Patient also complains of catching, sharp pains intermittently with pivoting and certain movements.      CONCURRENT MEDICAL HISTORY:    Past Medical History:   Diagnosis Date   • Arthritis    • Hypertension    • Injury of back    • Kidney stone        Allergies   Allergen Reactions   • Percocet [Oxycodone-Acetaminophen]      Patient stated he \"broke out in a sweat\" when given this medication.          Current Outpatient Prescriptions:   •  Cetirizine HCl (ZYRTEC ALLERGY PO), Take  by mouth., Disp: , Rfl:   •  Cholecalciferol (VITAMIN D3) 5000 UNITS capsule capsule, Take 5,000 Units by mouth Daily., Disp: , Rfl:   •  meloxicam (MOBIC) 15 MG tablet, TAKE 1 TABLET BY MOUTH DAILY FOR 30 DAYS., Disp: 30 tablet, Rfl: 1  •  NIFEdipine XL (PROCARDIA XL) 30 MG 24 hr tablet, Take 30 mg by mouth Daily., Disp: , Rfl:     No past surgical history on file.    ROS  No fevers or chills.  No chest pain or shortness of air.  No GI or  disturbances. " "Left knee pain.     PHYSICAL EXAMINATION:       Ht 175.3 cm (69\")   Wt 93.4 kg (206 lb)   BMI 30.42 kg/m²     Physical Exam   Constitutional: He is oriented to person, place, and time. Vital signs are normal. He appears well-developed and well-nourished. He is active and cooperative. He does not appear ill. No distress.   HENT:   Head: Normocephalic.   Pulmonary/Chest: Effort normal. No respiratory distress.   Abdominal: Soft. He exhibits no distension.   Musculoskeletal: He exhibits edema (Mild, left knee) and tenderness (Left knee, medial aspect). He exhibits no deformity.        Right knee: He exhibits no effusion.        Left knee: He exhibits no effusion.   Neurological: He is alert and oriented to person, place, and time. GCS eye subscore is 4. GCS verbal subscore is 5. GCS motor subscore is 6.   Skin: Skin is warm, dry and intact. Capillary refill takes less than 2 seconds. No erythema.   Psychiatric: He has a normal mood and affect. His speech is normal and behavior is normal. Judgment and thought content normal. Cognition and memory are normal.   Vitals reviewed.      GAIT:     []  Normal  [x]  Antalgic    Assistive device: [x]  None  []  Walker     []  Crutches  []  Cane     []  Wheelchair  []  Stretcher    Right Knee Exam     Tenderness   The patient is experiencing no tenderness.         Range of Motion   Extension: 0   Flexion: 130     Muscle Strength     The patient has normal right knee strength.    Tests   Mini:  Medial - negative Lateral - negative  Drawer:       Anterior - negative      Varus: negative  Valgus: negative    Other   Erythema: absent  Sensation: normal  Pulse: present  Swelling: none  Other tests: no effusion present      Left Knee Exam     Tenderness   The patient is experiencing tenderness in the medial joint line.    Range of Motion   Extension: 0   Flexion: 130     Muscle Strength     The patient has normal left knee strength.    Tests   Mini:  Medial - positive Lateral " - negative  Drawer:       Anterior - negative       Varus: negative  Valgus: negative    Other   Erythema: absent  Sensation: normal  Pulse: present  Swelling: mild  Effusion: no effusion present    Comments:  Mild pain with range of motion.             Mri Knee Left Without Contrast     Result Date: 4/17/2018  Narrative: Procedure: MR left knee without contrast Reason for exam: Left knee pain FINDINGS: Multisequence multiplanar MR imaging of the left knee was performed without contrast. Imaging of the bony structures reveals anterior medial tibial plateau small focus of abnormal high marrow signal on T2 weighting which is lower signal on T1-weighting most consistent with small bone contusion this region. Otherwise bony structures of the left knee are unremarkable. Full-thickness chondromalacia along the medial femoral condyle and medial tibial plateau. No other focus of chondromalacia. Small knee joint and suprapatellar joint effusion. The quadriceps femoris and patella tendon are intact. The anterior cruciate ligament is intact. The posterior cruciate ligament is intact. The medial collateral ligament is intact. The lateral collateral ligament is intact. Medial meniscus anterior and posterior horn oblique linear foci of abnormal signal which extend to the articular joint surface consistent with meniscal tears in this region. The lateral meniscus is intact.      Impression: 1.  Medial meniscus anterior and posterior horn oblique linear tears. 2.  Anterior medial tibial plateau abnormal marrow signal as described above suspicious for small focus of bone contusion. 3.  Full-thickness chondromalacia along the medial femoral condyle and medial tibial plateau. Most likely secondary to osteoarthritic changes. 4.  Small knee joint and suprapatellar effusions. 5.  Otherwise unremarkable MR left knee. Electronically signed by:  Sylvester Hudson MD  4/17/2018 4:24 PM CDT Workstation: [id number]     Left knee X-rays done at [facility] on  9/19/2017      Findings: There is joint space narrowing in the medial compartment with minimal osteophyte formation along the medial tibial plateau.  No joint effusion is noted.  There are no fractures.  Visualized joints are well aligned.     Impression:   Mild changes of osteoarthritis in the medial compartment with no acute bony abnormality      ASSESSMENT:    Diagnoses and all orders for this visit:    Primary osteoarthritis of left knee    Chronic pain of left knee    Internal derangement of left knee    Chondromalacia of left knee    Acute medial meniscus tear of left knee, subsequent encounter    PLAN    Patient continues to complain of left knee pain. Left knee pain increased around September 2017 with no known injury. Patient has been seeing orthopedics for his left knee since 12/22/2017 with little improvement despite multiple conservative treatment efforts including Meloxicam, rest and activity modification, home exercises, intra-articular injection of steroid and Orthovisc series. Patient continues to have pain to the medial aspect of his left knee. Offered repeat intra-articular injection of steroid today but patient declines. This did not help him much previously. Recommend to continue Meloxicam daily, as previously prescribed.  Patient also has Tramadol as needed for moderate to severe pain but he does not take it because he doesn't like to take pain medication. Patient expresses interest in a surgical consult to discuss possible surgical treatment of his meniscus tear. Continue with rest and modified activity during times of increased pain. Continue with elevation and ice therapy as needed to minimize pain/swelling. Follow up with Dr. Burgos for possible surgical consult.     Return for follow up with Dr. Burgos.      This document has been electronically signed by ANTOINE Shannon on June 20, 2018 7:08 PM      ANTOINE Shannon

## 2018-06-25 ENCOUNTER — OFFICE VISIT (OUTPATIENT)
Dept: ORTHOPEDIC SURGERY | Facility: CLINIC | Age: 58
End: 2018-06-25

## 2018-06-25 DIAGNOSIS — G89.29 CHRONIC PAIN OF LEFT KNEE: ICD-10-CM

## 2018-06-25 DIAGNOSIS — S83.242D ACUTE MEDIAL MENISCUS TEAR OF LEFT KNEE, SUBSEQUENT ENCOUNTER: ICD-10-CM

## 2018-06-25 DIAGNOSIS — M25.562 CHRONIC PAIN OF LEFT KNEE: ICD-10-CM

## 2018-06-25 DIAGNOSIS — M94.262 CHONDROMALACIA OF LEFT KNEE: Primary | ICD-10-CM

## 2018-06-25 DIAGNOSIS — M17.12 PRIMARY OSTEOARTHRITIS OF LEFT KNEE: ICD-10-CM

## 2018-06-25 PROCEDURE — 99214 OFFICE O/P EST MOD 30 MIN: CPT | Performed by: ORTHOPAEDIC SURGERY

## 2018-06-25 NOTE — PROGRESS NOTES
"Vincent Mary is a 57 y.o. male returns for     Chief Complaint   Patient presents with   • Left Knee - Follow-up       HISTORY OF PRESENT ILLNESS: Patient is here to discuss surgical options for his left knee. Patient has been previously followed by [facility]TO[facility]. Patient states he has previously had a MRI as well as xray's of the left knee which are available for review in chart. Previous failed treatments include: NSAIDS, over the counter pain medication, narcotic pain medication, steroid injections, visco supplement injections. Patient is ready to discuss surgical options at this time. Usually not respond anything else he has had.  He's had exercises as well its limiting his activity is not able to walk with his wife recreationally.       CONCURRENT MEDICAL HISTORY:    Past Medical History:   Diagnosis Date   • Arthritis    • Hypertension    • Injury of back    • Kidney stone        Allergies   Allergen Reactions   • Percocet [Oxycodone-Acetaminophen]      Patient stated he \"broke out in a sweat\" when given this medication.          Current Outpatient Prescriptions:   •  Cetirizine HCl (ZYRTEC ALLERGY PO), Take  by mouth., Disp: , Rfl:   •  Cholecalciferol (VITAMIN D3) 5000 UNITS capsule capsule, Take 5,000 Units by mouth Daily., Disp: , Rfl:   •  meloxicam (MOBIC) 15 MG tablet, TAKE 1 TABLET BY MOUTH DAILY FOR 30 DAYS., Disp: 30 tablet, Rfl: 1  •  NIFEdipine XL (PROCARDIA XL) 30 MG 24 hr tablet, Take 30 mg by mouth Daily., Disp: , Rfl:     History reviewed. No pertinent surgical history.    ROS  No fevers or chills.  No chest pain or shortness of air.  No GI or  disturbances.    PHYSICAL EXAMINATION:       There were no vitals taken for this visit.    Physical Exam   Constitutional: He is oriented to person, place, and time. He appears well-developed and well-nourished.   HENT:   Head: Normocephalic and atraumatic.   Eyes: EOM are normal. Pupils are equal, round, and reactive to light.   Neck: Neck " supple. No tracheal deviation present.   Pulmonary/Chest: Effort normal.   Musculoskeletal: He exhibits tenderness. He exhibits no edema or deformity.   Neurological: He is alert and oriented to person, place, and time.   Skin: Skin is warm and dry. No erythema.   Psychiatric: He has a normal mood and affect. Thought content normal.   Vitals reviewed.      GAIT:     [x]  Normal  []  Antalgic    Assistive device: [x]  None  []  Walker     []  Crutches  []  Cane     []  Wheelchair  []  Stretcher    Ortho Exam  Tender medial joint line left for extension flexion is good ligaments are stable calf is negative neurovascularly intact mild patellofemoral crepitus trace effusion.    No results found.     MRI scan and x-rays are reviewed now gone over the MRI scan with him I agree with the findings.      ASSESSMENT:    Diagnoses and all orders for this visit:    Chondromalacia of left knee    Acute medial meniscus tear of left knee, subsequent encounter    Primary osteoarthritis of left knee    Chronic pain of left knee          PLANI've gone over his MRI with him.  The MRI scan indicates bony edema medially which is likely bone-on-bone.  I discussed this with him I think his meniscal tear is degenerative in nature and although he does have some mechanical symptoms these are metabolic portion.  I think options at this point he may be a good candidate for a unicompartmental knee is ACL is intact lateral compartment is intact in his medial joint space narrowing or anteromedial osteoarthritis.  He's are discussed this with his wife I've suggested that he bring her back we can discuss this further and go over risks and benefits he decides to do so.    No Follow-up on file.    Vincent Burgos MD

## 2018-08-10 RX ORDER — MELOXICAM 15 MG/1
TABLET ORAL
Qty: 30 TABLET | Refills: 1 | Status: SHIPPED | OUTPATIENT
Start: 2018-08-10 | End: 2018-12-03 | Stop reason: SDUPTHER

## 2018-12-03 RX ORDER — MELOXICAM 15 MG/1
TABLET ORAL
Qty: 30 TABLET | Refills: 1 | Status: SHIPPED | OUTPATIENT
Start: 2018-12-03 | End: 2019-10-28 | Stop reason: ALTCHOICE

## 2019-06-17 ENCOUNTER — OFFICE VISIT (OUTPATIENT)
Dept: OTOLARYNGOLOGY | Facility: CLINIC | Age: 59
End: 2019-06-17

## 2019-06-17 VITALS — TEMPERATURE: 96.9 F | HEIGHT: 69 IN | BODY MASS INDEX: 30.63 KG/M2 | WEIGHT: 206.8 LBS

## 2019-06-17 DIAGNOSIS — R60.9 PAROTID SWELLING: Primary | ICD-10-CM

## 2019-06-17 PROCEDURE — 99203 OFFICE O/P NEW LOW 30 MIN: CPT | Performed by: OTOLARYNGOLOGY

## 2019-06-17 RX ORDER — ASPIRIN 81 MG/1
81 TABLET, CHEWABLE ORAL DAILY
COMMUNITY
End: 2019-10-28 | Stop reason: ALTCHOICE

## 2019-06-17 NOTE — PATIENT INSTRUCTIONS

## 2019-06-17 NOTE — PROGRESS NOTES
"Subjective   Vincent Mary is a 58 y.o. male.   Left parotid swelling  History of Present Illness   She states that he has had left parotid swelling for over a year comes and goes particularly painful recently he has a known history of back and shoulder problems but this is on the left side says is somewhat better now he use some lemon drops but has had no other treatment.  He denies any trauma to that area says he has not started any new medications recently      The following portions of the patient's history were reviewed and updated as appropriate: allergies, current medications, past family history, past medical history, past social history, past surgical history and problem list.      Vincent Mary reports that he has never smoked. He has never used smokeless tobacco. He reports that he does not drink alcohol or use drugs.  Patient is not a tobacco user and has not been counseled for use of tobacco products    Family History   Problem Relation Age of Onset   • Rheum arthritis Mother    • Heart failure Father    • Hypertension Father    • No Known Problems Sister    • No Known Problems Brother    • No Known Problems Son    • No Known Problems Daughter          Current Outpatient Medications:   •  aspirin 81 MG chewable tablet, Chew 81 mg Daily., Disp: , Rfl:   •  Cholecalciferol (VITAMIN D3) 5000 UNITS capsule capsule, Take 5,000 Units by mouth Daily., Disp: , Rfl:   •  meloxicam (MOBIC) 15 MG tablet, TAKE 1 TABLET BY MOUTH DAILY FOR 30 DAYS., Disp: 30 tablet, Rfl: 1  •  NIFEdipine XL (PROCARDIA XL) 30 MG 24 hr tablet, Take 30 mg by mouth Daily., Disp: , Rfl:   •  Cetirizine HCl (ZYRTEC ALLERGY PO), Take  by mouth., Disp: , Rfl:     Allergies   Allergen Reactions   • Percocet [Oxycodone-Acetaminophen]      Patient stated he \"broke out in a sweat\" when given this medication.        Past Medical History:   Diagnosis Date   • Arthritis    • Hypertension    • Injury of back    • Kidney stone  "       History reviewed. No pertinent surgical history.    Review of Systems   HENT: Positive for hearing loss.    Musculoskeletal: Positive for back pain and neck stiffness.        Arthritis   Neurological: Positive for headaches.   All other systems reviewed and are negative.          Objective   Physical Exam   Constitutional: He appears well-developed and well-nourished.   HENT:   Head: Normocephalic.   Right Ear: Hearing and external ear normal.   Left Ear: Hearing and external ear normal.   Nose: Septal deviation present.   Mouth/Throat: Uvula is midline, oropharynx is clear and moist and mucous membranes are normal. No tonsillar exudate.       Eyes: Conjunctivae are normal.   Neck: Normal range of motion and phonation normal.       Pulmonary/Chest: Effort normal.   Skin: Skin is warm.   Psychiatric: He has a normal mood and affect.             Assessment/Plan   Vincent was seen today for facial swelling.    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

## 2019-06-27 ENCOUNTER — HOSPITAL ENCOUNTER (OUTPATIENT)
Dept: CT IMAGING | Facility: HOSPITAL | Age: 59
Discharge: HOME | End: 2019-06-27
Admitting: OTOLARYNGOLOGY

## 2019-06-27 DIAGNOSIS — R60.9 PAROTID SWELLING: ICD-10-CM

## 2019-06-27 PROCEDURE — 70491 CT SOFT TISSUE NECK W/DYE: CPT

## 2019-06-27 PROCEDURE — 25010000002 IOPAMIDOL 61 % SOLUTION: Performed by: OTOLARYNGOLOGY

## 2019-06-27 RX ADMIN — IOPAMIDOL 90 ML: 612 INJECTION, SOLUTION INTRAVENOUS at 15:54

## 2019-07-01 ENCOUNTER — OFFICE VISIT (OUTPATIENT)
Dept: OTOLARYNGOLOGY | Facility: CLINIC | Age: 59
End: 2019-07-01

## 2019-07-01 VITALS
HEART RATE: 80 BPM | BODY MASS INDEX: 30.96 KG/M2 | TEMPERATURE: 97.8 F | OXYGEN SATURATION: 98 % | WEIGHT: 209 LBS | HEIGHT: 69 IN

## 2019-07-01 DIAGNOSIS — S04.70XA: Primary | ICD-10-CM

## 2019-07-01 PROCEDURE — 99213 OFFICE O/P EST LOW 20 MIN: CPT | Performed by: OTOLARYNGOLOGY

## 2019-07-01 NOTE — PROGRESS NOTES
"Subjective   Vincent Mary is a 58 y.o. male.     Patient comes back for review of CT  History of Present Illness     He had a history of moderate swelling and underwent a CT scan comes back review does not have any pain discomfort no mass no trouble swallowing no abnormal saliva is not having trouble swallowing or other symptoms  [place] complains about occasional pain while turning his neck in that area  The following portions of the patient's history were reviewed and updated as appropriate: allergies, current medications, past family history, past medical history, past social history, past surgical history and problem list.      Current Outpatient Medications:   •  aspirin 81 MG chewable tablet, Chew 81 mg Daily., Disp: , Rfl:   •  Cholecalciferol (VITAMIN D3) 5000 UNITS capsule capsule, Take 5,000 Units by mouth Daily., Disp: , Rfl:   •  Cyanocobalamin (B-12 COMPLIANCE INJECTION IJ), Inject 1,000 mg as directed Every 30 (Thirty) Days., Disp: , Rfl:   •  meloxicam (MOBIC) 15 MG tablet, TAKE 1 TABLET BY MOUTH DAILY FOR 30 DAYS., Disp: 30 tablet, Rfl: 1  •  NIFEdipine XL (PROCARDIA XL) 30 MG 24 hr tablet, Take 30 mg by mouth Daily., Disp: , Rfl:     Allergies   Allergen Reactions   • Percocet [Oxycodone-Acetaminophen]      Patient stated he \"broke out in a sweat\" when given this medication.              Review of Systems   Constitutional: Negative for fever.   HENT: Negative for ear pain, facial swelling, sore throat, trouble swallowing and voice change.    Hematological: Negative for adenopathy.           Objective   Physical Exam   Constitutional: He appears well-developed and well-nourished.   HENT:   Head: Normocephalic.   Right Ear: Hearing and external ear normal.   Left Ear: Hearing and external ear normal.   Nose: Septal deviation present.   Mouth/Throat: Uvula is midline, oropharynx is clear and moist and mucous membranes are normal. No tonsillar exudate.       Eyes: Conjunctivae and EOM are normal. "   Neck: Normal range of motion and phonation normal.       Pulmonary/Chest: Effort normal.   Skin: Skin is warm.   Psychiatric: He has a normal mood and affect.     Reason for exam: Neck mass, nonpulsatile, solitary     FINDINGS: Axial computer tomography sequential imaging was  performed from the lower pelvis through the thoracic inlet after  dynamic bolus contrast injection. Sagittal and coronal  reformation was performed. This exam was performed according to  our departmental dose optimization program, which includes  automated exposure control, adjustment of the mA and/or KV  according to patient size and/or use of iterative reconstruction  technique.     Visualized intraorbital structures are normal. Visualized  intracranial structures are normal. Bilateral salivary glands are  normal. The nasopharynx, oropharynx, and larynx are normal. The  thyroid gland is normal. No lymphadenopathy in the region of the  neck. Severe right neck sternocleidomastoid muscle atrophy and  mild hypertrophy of the left sternocleidomastoid muscle. The lung  apices are normal. No acute osseous abnormality. Moderate C5-6  degenerative disc disease with moderate loss of disc space height  and anterior osteophytosis.     IMPRESSION:  1.  Severe right neck sternocleidomastoid muscle atrophy and mild  hypertrophy of the left sternocleidomastoid muscle. This is of  uncertain etiology. Cannot exclude right cranial nerve XI  compromise.  2.  Moderate C5-6 degenerative disc disease.  3.  Otherwise unremarkable CT soft tissue neck exam         Assessment/Plan   Vincent was seen today for follow-up.    Diagnoses and all orders for this visit:    Injury of accessory nerve, unspecified laterality, initial encounter  -     Ambulatory Referral to Neurology    Patient has no evidence of neck mass or parotid mass and some of these what appear to be swelling may be because of the asymmetry of his sternocleidomastoid muscles.  I discussed the patient not  sure what would cause bilateral weakness in the muscles but some neurologic disease.  He did have injuries to his neck in the distant past.  Usually this would be more at the base of the skull.    I discussed there may need to be further work-up including MRIs of her other testing per the neurologist but I would appreciate getting their help this because I have no idea what would cause bilateral swelling other than denervation of the nerve some neurologic cause but he has no history to explain that and has had no surgery at that particular area of the neck he is agree with this and all questions answered

## 2019-07-03 ENCOUNTER — TELEPHONE (OUTPATIENT)
Dept: OTOLARYNGOLOGY | Facility: CLINIC | Age: 59
End: 2019-07-03

## 2019-07-03 NOTE — TELEPHONE ENCOUNTER
----- Message from Rob Andersen sent at 7/3/2019  2:37 PM CDT -----  Contact: [phone]  Mr. Mary called to say that he has some questions about the CT results that Dr. Hood went over with him. Wants to ask about him working and that neck bothering him more. Has not received appt. From Neurologist yet.      Vincent Mary [phone]

## 2019-07-03 NOTE — TELEPHONE ENCOUNTER
Spoke with patient who said that he called the neurologists office in [place] and they said they wouldn't be able to get him seen until sometime in September and he understood that Dr. Hood wanted him seen sooner than that. I told him that Dr. Hood said he would prefer for him to be seen sooner than that and if it was okay with him, our office would try and find him someone who could see him sooner. He said as long as they take his insurance, he is okay with going somewhere other than [place].    He is to call our office if he has any other questions or concerns.

## 2019-07-09 ENCOUNTER — HOSPITAL ENCOUNTER (OUTPATIENT)
Dept: SLEEP MEDICINE | Facility: HOSPITAL | Age: 59
End: 2019-07-09

## 2019-10-11 ENCOUNTER — OFFICE VISIT (OUTPATIENT)
Dept: OTOLARYNGOLOGY | Facility: CLINIC | Age: 59
End: 2019-10-11

## 2019-10-11 VITALS — BODY MASS INDEX: 30.07 KG/M2 | TEMPERATURE: 97 F | HEIGHT: 69 IN | WEIGHT: 203 LBS

## 2019-10-11 DIAGNOSIS — M54.2 NECK PAIN: Primary | ICD-10-CM

## 2019-10-11 PROCEDURE — 99213 OFFICE O/P EST LOW 20 MIN: CPT | Performed by: OTOLARYNGOLOGY

## 2019-10-11 NOTE — PATIENT INSTRUCTIONS

## 2019-10-28 ENCOUNTER — CONSULT (OUTPATIENT)
Dept: SURGERY | Facility: CLINIC | Age: 59
End: 2019-10-28

## 2019-10-28 VITALS
DIASTOLIC BLOOD PRESSURE: 84 MMHG | BODY MASS INDEX: 30.36 KG/M2 | HEART RATE: 87 BPM | TEMPERATURE: 98.3 F | SYSTOLIC BLOOD PRESSURE: 134 MMHG | WEIGHT: 205 LBS | HEIGHT: 69 IN

## 2019-10-28 DIAGNOSIS — M79.89 SOFT TISSUE MASS: Primary | ICD-10-CM

## 2019-10-28 PROCEDURE — 99203 OFFICE O/P NEW LOW 30 MIN: CPT | Performed by: SURGERY

## 2019-10-28 NOTE — PATIENT INSTRUCTIONS

## 2019-10-28 NOTE — PROGRESS NOTES
Subjective   Vincent Mary is a 58 y.o. male     Chief Complaint: soft tissue mass left arm    History of Present Illness referred by Dr. Bourgeois for evaluation of soft tissue mass lower aspect of his left upper arm.  Patient says is been there for about a year it slowly gotten larger.  Other people of noticed this.  He had what was likely lipomas removed from his left wrist at 2 separate sites but may have been cyst as well but is not sure.  No symptoms related to this soft tissue mass.  No history of trauma or any type of injection at that site.  No pain.  No history of drainage    Review of Systems   Constitutional: Negative.    HENT: Positive for hearing loss.    Eyes: Negative.    Respiratory: Positive for cough.    Cardiovascular: Negative.    Gastrointestinal:        Difficulty swallowing   Endocrine: Negative.    Genitourinary: Positive for difficulty urinating and frequency.   Musculoskeletal: Positive for arthralgias and back pain.   Skin: Negative.    Allergic/Immunologic: Negative.    Neurological: Positive for dizziness, numbness and headaches.        Numbness & tingling bilateral hands and feet   Hematological: Negative.    Psychiatric/Behavioral: Negative.      Past Medical History:   Diagnosis Date   • Arthritis    • Hypertension    • Injury of back    • Kidney stone      History reviewed. No pertinent surgical history.  Family History   Problem Relation Age of Onset   • Rheum arthritis Mother    • Heart failure Father    • Hypertension Father    • No Known Problems Sister    • No Known Problems Brother    • No Known Problems Son    • No Known Problems Daughter      Social History     Socioeconomic History   • Marital status:      Spouse name: Not on file   • Number of children: Not on file   • Years of education: Not on file   • Highest education level: Not on file   Tobacco Use   • Smoking status: Former Smoker     Types: Cigarettes   • Smokeless tobacco: Former User     Quit date:  "2015   Substance and Sexual Activity   • Alcohol use: No   • Drug use: No   • Sexual activity: Defer     Allergies   Allergen Reactions   • Percocet [Oxycodone-Acetaminophen] Other (See Comments)     Patient stated he \"broke out in a sweat\" when given this medication.      Vitals:    10/28/19 1511   BP: 134/84   Pulse: 87   Temp: 98.3 °F (36.8 °C)       Home Medications:  Prior to Admission medications    Medication Sig Start Date End Date Taking? Authorizing Provider   Cholecalciferol (VITAMIN D3) 5000 UNITS capsule capsule Take 5,000 Units by mouth Daily.   Yes Emergency, Nurse Epic, RN   NIFEdipine XL (PROCARDIA XL) 30 MG 24 hr tablet Take 30 mg by mouth Daily.   Yes Emergency, Nurse Epic, RN   aspirin 81 MG chewable tablet Chew 81 mg Daily.  10/28/19  Provider, MD Jose   Cyanocobalamin (B-12 COMPLIANCE INJECTION IJ) Inject 1,000 mg as directed Every 30 (Thirty) Days.  10/28/19  ProviderJose MD   meloxicam (MOBIC) 15 MG tablet TAKE 1 TABLET BY MOUTH DAILY FOR 30 DAYS. 12/3/18 10/28/19  Leila Barbosa APRN        Objective   Physical Exam   Constitutional: He is oriented to person, place, and time. He appears well-developed and well-nourished. No distress.   HENT:   Head: Normocephalic and atraumatic.   Nose: Nose normal.   Eyes: Conjunctivae are normal.   Neck: Normal range of motion. No tracheal deviation present. No thyromegaly present.   Cardiovascular: Normal rate, regular rhythm and normal heart sounds.   No murmur heard.  Pulmonary/Chest: Effort normal and breath sounds normal. No respiratory distress. He has no wheezes. He has no rales. He exhibits no tenderness.   Abdominal: Soft. He exhibits no distension. There is no tenderness. There is no rebound and no guarding. No hernia.   Musculoskeletal: He exhibits no tenderness or deformity.   Neurological: He is alert and oriented to person, place, and time.   Skin: Skin is warm and dry. No rash noted.   Psychiatric: He has a normal mood and " affect. His behavior is normal. Judgment and thought content normal.   Vitals reviewed.  4 x 4 centimeter lipoma-like mass does not feel like its involving the deeper fascia or biceps muscle.  No skin abnormalities no sinus or drainage no redness no tenderness    Assessment/Plan Soft tissue mass suspect lipoma left distal upper arm.  Discussed options and patient wished to speak with his wife about having this removed.  He will call back and we can set up the surgery at that time.  We talked about doing in the operating room versus the office and wished to have it done in the operating room.      The encounter diagnosis was Soft tissue mass.                     This document has been electronically signed by Dutch Desai MD on October 28, 2019 6:28 PM

## 2020-06-23 ENCOUNTER — OFFICE VISIT (OUTPATIENT)
Dept: SURGERY | Facility: CLINIC | Age: 60
End: 2020-06-23

## 2020-06-23 VITALS
DIASTOLIC BLOOD PRESSURE: 74 MMHG | SYSTOLIC BLOOD PRESSURE: 122 MMHG | HEART RATE: 71 BPM | BODY MASS INDEX: 30.45 KG/M2 | HEIGHT: 69 IN | TEMPERATURE: 97 F | WEIGHT: 205.6 LBS

## 2020-06-23 DIAGNOSIS — M79.89 SOFT TISSUE MASS: Primary | ICD-10-CM

## 2020-06-23 PROCEDURE — 99213 OFFICE O/P EST LOW 20 MIN: CPT | Performed by: SURGERY

## 2020-06-23 RX ORDER — SODIUM CHLORIDE 0.9 % (FLUSH) 0.9 %
10 SYRINGE (ML) INJECTION AS NEEDED
Status: CANCELLED | OUTPATIENT
Start: 2020-06-30

## 2020-06-23 RX ORDER — NIFEDIPINE 30 MG/1
TABLET, EXTENDED RELEASE ORAL DAILY
COMMUNITY
Start: 2019-11-19 | End: 2020-06-29

## 2020-06-23 RX ORDER — NEEDLES, FILTER 19GX1 1/2"
NEEDLE, DISPOSABLE MISCELLANEOUS
COMMUNITY
Start: 2020-06-11

## 2020-06-23 RX ORDER — SODIUM CHLORIDE 0.9 % (FLUSH) 0.9 %
3 SYRINGE (ML) INJECTION EVERY 12 HOURS SCHEDULED
Status: CANCELLED | OUTPATIENT
Start: 2020-06-30

## 2020-06-23 RX ORDER — CYANOCOBALAMIN 1000 UG/ML
INJECTION, SOLUTION INTRAMUSCULAR; SUBCUTANEOUS
COMMUNITY
Start: 2020-06-11 | End: 2020-06-29 | Stop reason: ALTCHOICE

## 2020-06-23 RX ORDER — LANOLIN ALCOHOL/MO/W.PET/CERES
1000 CREAM (GRAM) TOPICAL DAILY
COMMUNITY

## 2020-06-23 RX ORDER — METOPROLOL SUCCINATE 25 MG/1
25 TABLET, EXTENDED RELEASE ORAL DAILY
COMMUNITY
Start: 2020-02-11

## 2020-06-23 RX ORDER — IBUPROFEN 800 MG/1
800 TABLET ORAL AS NEEDED
COMMUNITY
Start: 2020-01-21

## 2020-06-23 NOTE — PATIENT INSTRUCTIONS

## 2020-06-23 NOTE — PROGRESS NOTES
"59-year-old male who we initially saw back in October of last year with what was thought to be a lipoma involving the distal part of his left upper arm overlying the biceps muscle.  Discussed doing surgery at that time patient wished to talk over with his family which she has done.  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  Been no change in his medical history or surgical history since that time.  The lesion has gotten slightly larger since then.  No change in it otherwise.  Occasionally has some pain associated with it.  No paresthesias and no pain or issues distal to the mass.    Vitals:    06/23/20 0951   BP: 122/74   Pulse: 71   Temp: 97 °F (36.1 °C)       Allergies:   Allergies   Allergen Reactions   • Percocet [Oxycodone-Acetaminophen] Other (See Comments)     Patient stated he \"broke out in a sweat\" when given this medication.        Home Medications:  Prior to Admission medications    Medication Sig Start Date End Date Taking? Authorizing Provider   metoprolol succinate XL (TOPROL-XL) 25 MG 24 hr tablet Take 25 mg by mouth Daily. 2/11/20  Yes Jose Jeter MD   NIFEdipine XL (PROCARDIA XL) 30 MG 24 hr tablet Take 30 mg by mouth Daily.   Yes Emergency, Nurse Epic, RN   NIFEdipine XL (PROCARDIA XL) 30 MG 24 hr tablet Daily. 11/19/19  Yes Jose Jeter MD   vitamin B-12 (CYANOCOBALAMIN) 1000 MCG tablet Take 1,000 mcg by mouth Daily.   Yes Jose Jeter MD   BD INTEGRA SYRINGE 25G X 1\" 3 ML misc USE AS DIRECTED FOR B 12 IM INJECTIONS. 6/11/20   Jose Jeter MD   Cholecalciferol (VITAMIN D3) 5000 UNITS capsule capsule Take 5,000 Units by mouth Daily.    Emergency, Nurse CÉSAR Be   cyanocobalamin 1000 MCG/ML injection INJECT 1ML INTRAMUSCULARLY WEEKLY. 6/11/20   Jose Jeter MD   ibuprofen (ADVIL,MOTRIN) 800 MG tablet As Needed. 1/21/20   Jose Jeter MD       Social History "     Socioeconomic History   • Marital status:      Spouse name: Not on file   • Number of children: Not on file   • Years of education: Not on file   • Highest education level: Not on file   Tobacco Use   • Smoking status: Former Smoker     Types: Cigarettes   • Smokeless tobacco: Former User     Quit date: 2015   Substance and Sexual Activity   • Alcohol use: No   • Drug use: No   • Sexual activity: Defer       Past Medical History:   Diagnosis Date   • Arthritis    • Hypertension    • Injury of back    • Kidney stone        Family History   Problem Relation Age of Onset   • Rheum arthritis Mother    • Heart failure Father    • Hypertension Father    • No Known Problems Sister    • No Known Problems Brother    • No Known Problems Son    • No Known Problems Daughter        No past surgical history on file.      Review of systems  Denies chest pain  Denies shortness of breath  Denies nausea vomiting  Denies asthma  Does not take any anticoagulants  No bleeding issues        Alert and appropriate  Nontoxic  Nonicteric  Neck with any obvious masses  Lungs clear  Heart regular rate and rhythm  Abdomen soft  Left arm distal upper arm overlying the distal portion of the biceps muscle he has a 4 x 5 cm mass that is not fixed to the underlying structures and does not involve the biceps muscle on exam.  No skin changes.  No obvious pit.  No paresthesias distal to this normal sensation and strength distal to this area.  Good active range of motion of the elbow.    Assessment and plan  Subcu lipoma.  Patient wished to have this excised.  Fully discussed the procedure alternatives risk benefits with him he clearly understands and wishes to proceed

## 2020-06-27 PROCEDURE — C9803 HOPD COVID-19 SPEC COLLECT: HCPCS | Performed by: SURGERY

## 2020-06-27 PROCEDURE — U0003 INFECTIOUS AGENT DETECTION BY NUCLEIC ACID (DNA OR RNA); SEVERE ACUTE RESPIRATORY SYNDROME CORONAVIRUS 2 (SARS-COV-2) (CORONAVIRUS DISEASE [COVID-19]), AMPLIFIED PROBE TECHNIQUE, MAKING USE OF HIGH THROUGHPUT TECHNOLOGIES AS DESCRIBED BY CMS-2020-01-R: HCPCS | Performed by: SURGERY

## 2020-06-28 LAB
COVID LABCORP PRIORITY: NORMAL
SARS-COV-2 RNA RESP QL NAA+PROBE: NOT DETECTED

## 2020-06-29 ENCOUNTER — ANESTHESIA EVENT (OUTPATIENT)
Dept: PERIOP | Facility: HOSPITAL | Age: 60
End: 2020-06-29

## 2020-06-30 ENCOUNTER — ANESTHESIA (OUTPATIENT)
Dept: PERIOP | Facility: HOSPITAL | Age: 60
End: 2020-06-30

## 2020-06-30 ENCOUNTER — HOSPITAL ENCOUNTER (OUTPATIENT)
Facility: HOSPITAL | Age: 60
Setting detail: HOSPITAL OUTPATIENT SURGERY
Discharge: HOME | End: 2020-06-30
Attending: SURGERY | Admitting: SURGERY

## 2020-06-30 VITALS
TEMPERATURE: 97 F | RESPIRATION RATE: 18 BRPM | SYSTOLIC BLOOD PRESSURE: 135 MMHG | HEART RATE: 55 BPM | DIASTOLIC BLOOD PRESSURE: 64 MMHG | OXYGEN SATURATION: 98 %

## 2020-06-30 DIAGNOSIS — M79.89 SOFT TISSUE MASS: ICD-10-CM

## 2020-06-30 PROCEDURE — 25010000002 PROPOFOL 10 MG/ML EMULSION: Performed by: NURSE ANESTHETIST, CERTIFIED REGISTERED

## 2020-06-30 PROCEDURE — 25010000002 FENTANYL CITRATE (PF) 100 MCG/2ML SOLUTION: Performed by: NURSE ANESTHETIST, CERTIFIED REGISTERED

## 2020-06-30 PROCEDURE — 93005 ELECTROCARDIOGRAM TRACING: CPT | Performed by: ANESTHESIOLOGY

## 2020-06-30 PROCEDURE — 25010000002 MIDAZOLAM PER 1 MG: Performed by: NURSE ANESTHETIST, CERTIFIED REGISTERED

## 2020-06-30 PROCEDURE — 25010000002 DEXAMETHASONE PER 1 MG: Performed by: NURSE ANESTHETIST, CERTIFIED REGISTERED

## 2020-06-30 PROCEDURE — 24071 EXC ARM/ELBOW LES SC 3 CM/>: CPT | Performed by: SURGERY

## 2020-06-30 PROCEDURE — 93010 ELECTROCARDIOGRAM REPORT: CPT | Performed by: INTERNAL MEDICINE

## 2020-06-30 PROCEDURE — 25010000002 ONDANSETRON PER 1 MG: Performed by: NURSE ANESTHETIST, CERTIFIED REGISTERED

## 2020-06-30 RX ORDER — SODIUM CHLORIDE, SODIUM GLUCONATE, SODIUM ACETATE, POTASSIUM CHLORIDE, AND MAGNESIUM CHLORIDE 526; 502; 368; 37; 30 MG/100ML; MG/100ML; MG/100ML; MG/100ML; MG/100ML
1000 INJECTION, SOLUTION INTRAVENOUS CONTINUOUS
Status: DISCONTINUED | OUTPATIENT
Start: 2020-06-30 | End: 2020-06-30 | Stop reason: HOSPADM

## 2020-06-30 RX ORDER — FLUMAZENIL 0.1 MG/ML
0.2 INJECTION INTRAVENOUS AS NEEDED
Status: DISCONTINUED | OUTPATIENT
Start: 2020-06-30 | End: 2020-06-30 | Stop reason: HOSPADM

## 2020-06-30 RX ORDER — LIDOCAINE HYDROCHLORIDE 20 MG/ML
INJECTION, SOLUTION INFILTRATION; PERINEURAL AS NEEDED
Status: DISCONTINUED | OUTPATIENT
Start: 2020-06-30 | End: 2020-06-30 | Stop reason: SURG

## 2020-06-30 RX ORDER — PROMETHAZINE HYDROCHLORIDE 25 MG/1
25 TABLET ORAL ONCE AS NEEDED
Status: DISCONTINUED | OUTPATIENT
Start: 2020-06-30 | End: 2020-06-30 | Stop reason: HOSPADM

## 2020-06-30 RX ORDER — PROMETHAZINE HYDROCHLORIDE 25 MG/ML
12.5 INJECTION, SOLUTION INTRAMUSCULAR; INTRAVENOUS ONCE AS NEEDED
Status: DISCONTINUED | OUTPATIENT
Start: 2020-06-30 | End: 2020-06-30 | Stop reason: HOSPADM

## 2020-06-30 RX ORDER — MIDAZOLAM HYDROCHLORIDE 1 MG/ML
INJECTION INTRAMUSCULAR; INTRAVENOUS AS NEEDED
Status: DISCONTINUED | OUTPATIENT
Start: 2020-06-30 | End: 2020-06-30 | Stop reason: SURG

## 2020-06-30 RX ORDER — DIPHENHYDRAMINE HYDROCHLORIDE 50 MG/ML
12.5 INJECTION INTRAMUSCULAR; INTRAVENOUS
Status: DISCONTINUED | OUTPATIENT
Start: 2020-06-30 | End: 2020-06-30 | Stop reason: HOSPADM

## 2020-06-30 RX ORDER — SODIUM CHLORIDE 0.9 % (FLUSH) 0.9 %
3 SYRINGE (ML) INJECTION EVERY 12 HOURS SCHEDULED
Status: DISCONTINUED | OUTPATIENT
Start: 2020-06-30 | End: 2020-06-30 | Stop reason: HOSPADM

## 2020-06-30 RX ORDER — PROPOFOL 10 MG/ML
VIAL (ML) INTRAVENOUS AS NEEDED
Status: DISCONTINUED | OUTPATIENT
Start: 2020-06-30 | End: 2020-06-30 | Stop reason: SURG

## 2020-06-30 RX ORDER — DEXAMETHASONE SODIUM PHOSPHATE 4 MG/ML
INJECTION, SOLUTION INTRA-ARTICULAR; INTRALESIONAL; INTRAMUSCULAR; INTRAVENOUS; SOFT TISSUE AS NEEDED
Status: DISCONTINUED | OUTPATIENT
Start: 2020-06-30 | End: 2020-06-30 | Stop reason: SURG

## 2020-06-30 RX ORDER — MEPERIDINE HYDROCHLORIDE 25 MG/ML
12.5 INJECTION INTRAMUSCULAR; INTRAVENOUS; SUBCUTANEOUS
Status: DISCONTINUED | OUTPATIENT
Start: 2020-06-30 | End: 2020-06-30 | Stop reason: HOSPADM

## 2020-06-30 RX ORDER — NALOXONE HCL 0.4 MG/ML
0.4 VIAL (ML) INJECTION AS NEEDED
Status: DISCONTINUED | OUTPATIENT
Start: 2020-06-30 | End: 2020-06-30 | Stop reason: HOSPADM

## 2020-06-30 RX ORDER — ACETAMINOPHEN 325 MG/1
650 TABLET ORAL ONCE AS NEEDED
Status: DISCONTINUED | OUTPATIENT
Start: 2020-06-30 | End: 2020-06-30 | Stop reason: HOSPADM

## 2020-06-30 RX ORDER — FENTANYL CITRATE 50 UG/ML
INJECTION, SOLUTION INTRAMUSCULAR; INTRAVENOUS AS NEEDED
Status: DISCONTINUED | OUTPATIENT
Start: 2020-06-30 | End: 2020-06-30 | Stop reason: SURG

## 2020-06-30 RX ORDER — LABETALOL HYDROCHLORIDE 5 MG/ML
5 INJECTION, SOLUTION INTRAVENOUS
Status: DISCONTINUED | OUTPATIENT
Start: 2020-06-30 | End: 2020-06-30 | Stop reason: HOSPADM

## 2020-06-30 RX ORDER — PROMETHAZINE HYDROCHLORIDE 25 MG/1
25 SUPPOSITORY RECTAL ONCE AS NEEDED
Status: DISCONTINUED | OUTPATIENT
Start: 2020-06-30 | End: 2020-06-30 | Stop reason: HOSPADM

## 2020-06-30 RX ORDER — ACETAMINOPHEN 650 MG/1
650 SUPPOSITORY RECTAL ONCE AS NEEDED
Status: DISCONTINUED | OUTPATIENT
Start: 2020-06-30 | End: 2020-06-30 | Stop reason: HOSPADM

## 2020-06-30 RX ORDER — ONDANSETRON 2 MG/ML
4 INJECTION INTRAMUSCULAR; INTRAVENOUS ONCE AS NEEDED
Status: DISCONTINUED | OUTPATIENT
Start: 2020-06-30 | End: 2020-06-30 | Stop reason: HOSPADM

## 2020-06-30 RX ORDER — ONDANSETRON 2 MG/ML
INJECTION INTRAMUSCULAR; INTRAVENOUS AS NEEDED
Status: DISCONTINUED | OUTPATIENT
Start: 2020-06-30 | End: 2020-06-30 | Stop reason: SURG

## 2020-06-30 RX ORDER — SODIUM CHLORIDE 0.9 % (FLUSH) 0.9 %
10 SYRINGE (ML) INJECTION AS NEEDED
Status: DISCONTINUED | OUTPATIENT
Start: 2020-06-30 | End: 2020-06-30 | Stop reason: HOSPADM

## 2020-06-30 RX ORDER — HYDROCODONE BITARTRATE AND ACETAMINOPHEN 7.5; 325 MG/1; MG/1
1 TABLET ORAL EVERY 6 HOURS PRN
Qty: 12 TABLET | Refills: 0 | Status: SHIPPED | OUTPATIENT
Start: 2020-06-30 | End: 2020-07-08

## 2020-06-30 RX ORDER — EPHEDRINE SULFATE 50 MG/ML
5 INJECTION, SOLUTION INTRAVENOUS ONCE AS NEEDED
Status: DISCONTINUED | OUTPATIENT
Start: 2020-06-30 | End: 2020-06-30 | Stop reason: HOSPADM

## 2020-06-30 RX ADMIN — ONDANSETRON 4 MG: 2 INJECTION INTRAMUSCULAR; INTRAVENOUS at 14:49

## 2020-06-30 RX ADMIN — PROPOFOL 200 MG: 10 INJECTION, EMULSION INTRAVENOUS at 14:12

## 2020-06-30 RX ADMIN — DEXAMETHASONE SODIUM PHOSPHATE 4 MG: 4 INJECTION, SOLUTION INTRAMUSCULAR; INTRAVENOUS at 14:49

## 2020-06-30 RX ADMIN — LIDOCAINE HYDROCHLORIDE 100 MG: 20 INJECTION, SOLUTION INFILTRATION; PERINEURAL at 14:12

## 2020-06-30 RX ADMIN — FENTANYL CITRATE 50 MCG: 50 INJECTION, SOLUTION INTRAMUSCULAR; INTRAVENOUS at 14:24

## 2020-06-30 RX ADMIN — MIDAZOLAM HYDROCHLORIDE 1 MG: 2 INJECTION, SOLUTION INTRAMUSCULAR; INTRAVENOUS at 14:06

## 2020-06-30 RX ADMIN — SODIUM CHLORIDE, SODIUM GLUCONATE, SODIUM ACETATE, POTASSIUM CHLORIDE, AND MAGNESIUM CHLORIDE 1000 ML: 526; 502; 368; 37; 30 INJECTION, SOLUTION INTRAVENOUS at 13:36

## 2020-06-30 RX ADMIN — SODIUM CHLORIDE, SODIUM GLUCONATE, SODIUM ACETATE, POTASSIUM CHLORIDE, AND MAGNESIUM CHLORIDE: 526; 502; 368; 37; 30 INJECTION, SOLUTION INTRAVENOUS at 14:06

## 2020-06-30 NOTE — ANESTHESIA POSTPROCEDURE EVALUATION
Patient: Vincent Mary    Procedure Summary     Date:  06/30/20 Room / Location:  [facility] OR [address] [place] [address] OR    Anesthesia Start:  1406 Anesthesia Stop:  1507    Procedure:  EXCISE SOFT TISSUE MASS LEFT UPPER ARM (Left ) Diagnosis:       Soft tissue mass      (Soft tissue mass [M79.89])    Surgeon:  Dutch Desai MD Provider:  Yan Bailon MD    Anesthesia Type:  general ASA Status:  3          Anesthesia Type: general    Vitals  No vitals data found for the desired time range.          Post Anesthesia Care and Evaluation    Patient location during evaluation: PACU  Patient participation: complete - patient participated  Level of consciousness: awake and alert  Pain score: 0  Pain management: adequate  Airway patency: patent  Anesthetic complications: No anesthetic complications    Cardiovascular status: acceptable  Respiratory status: acceptable  Hydration status: acceptable

## 2020-06-30 NOTE — ANESTHESIA PREPROCEDURE EVALUATION
Anesthesia Evaluation     Patient summary reviewed and Nursing notes reviewed   no history of anesthetic complications:  NPO Solid Status: > 8 hours  NPO Liquid Status: > 8 hours           Airway   Mallampati: II  TM distance: >3 FB  Neck ROM: full  possible difficult intubation  Dental    (+) poor dentation    Comment: Upper incisor caps.    Pulmonary - normal exam    breath sounds clear to auscultation  (+) a smoker Former,     ROS comment: FINDINGS: Cardiac silhouette is normal in size. Pulmonary  vascularity is unremarkable.      No focal infiltrate or consolidation.  No pleural effusion.  No  pneumothorax.     IMPRESSION:  CONCLUSION: No evidence of active disease.     Electronically signed by:  Zion Walden MD  11/16/2017 11:10 AM  CST Workstation: [employer]  Cardiovascular - normal exam    Patient on routine beta blocker and Beta blocker given within 24 hours of surgery  Rhythm: regular  Rate: normal    (+) hypertension,   (-) murmur      Neuro/Psych- negative ROS  GI/Hepatic/Renal/Endo    (+) obesity,   renal disease stones,     Musculoskeletal     (+) back pain, neck pain,       ROS comment: 2 views of the lumbar spine reveal degenerative changes with decreased vertebral disc spacing noted at L1-L2, T12-L1 and T11-T12.  There are multiple osteophyte formations noted to the anterior spine with the largest osteophytes noted anteriorly at L2 and L3.  No evidence of fracture.  No acute radiologic abnormalities are noted at this time.01/19/18 at 5:20 PM by ANTOINE Shannon   3 views of the thoracic spine reveal degenerative changes with decreased vertebral disc spacing at multiple levels throughout the thoracic spine. There is a chronic compression deformity of the T6 vertebral body.  No evidence of acute fracture.  No acute radiologic abnormalities are noted at this time. 01/22/18 at 3:29 PM by ANTOINE Shannon  Abdominal   (+) obese,    Substance History - negative use     OB/GYN negative ob/gyn ROS          Other   arthritis,                      Anesthesia Plan    ASA 3     general     intravenous induction     Anesthetic plan, all risks, benefits, and alternatives have been provided, discussed and informed consent has been obtained with: patient.

## 2020-06-30 NOTE — ANESTHESIA PROCEDURE NOTES
Airway  Date/Time: 6/30/2020 2:15 PM  End Time:6/30/2020 2:15 AM  Airway not difficult    General Information and Staff    Patient location during procedure: OR    Indications and Patient Condition  Indications for airway management: airway protection    Preoxygenated: yes  MILS maintained throughout  Mask difficulty assessment: 0 - not attempted    Final Airway Details  Final airway type: supraglottic airway      Successful airway: Size 4    Number of attempts at approach: 1  Assessment: lips, teeth, and gum same as pre-op and atraumatic intubation

## 2020-07-06 LAB
LAB AP CASE REPORT: NORMAL
PATH REPORT.FINAL DX SPEC: NORMAL

## 2020-07-08 ENCOUNTER — OFFICE VISIT (OUTPATIENT)
Dept: SURGERY | Facility: CLINIC | Age: 60
End: 2020-07-08

## 2020-07-08 VITALS
TEMPERATURE: 98.3 F | HEART RATE: 75 BPM | DIASTOLIC BLOOD PRESSURE: 78 MMHG | BODY MASS INDEX: 30.07 KG/M2 | SYSTOLIC BLOOD PRESSURE: 122 MMHG | WEIGHT: 203 LBS | HEIGHT: 69 IN

## 2020-07-08 DIAGNOSIS — D17.20 LIPOMA OF UPPER EXTREMITY, UNSPECIFIED LATERALITY: Primary | ICD-10-CM

## 2020-07-08 PROCEDURE — 99024 POSTOP FOLLOW-UP VISIT: CPT | Performed by: SURGERY

## 2020-07-08 NOTE — PATIENT INSTRUCTIONS

## 2020-07-08 NOTE — PROGRESS NOTES
59-year-old male who is one-week status post excision of a subcutaneous lipoma from his left upper arm.  I went over pathology with him answered all his questions.  His wound is clean is healing nicely there is no redness no drainage no significant seroma appreciated.  Patient has good active range of motion of his shoulder and his elbow.  He will follow-up with us on apparent basis

## (undated) DEVICE — SUT MONOCRYL 4/0 PS2 27IN Y426H ETY426H

## (undated) DEVICE — STERILE POLYISOPRENE POWDER-FREE SURGICAL GLOVES WITH EMOLLIENT COATING: Brand: PROTEXIS

## (undated) DEVICE — GLV SURG SIGNATURE ESSENTIAL PF LTX SZ7

## (undated) DEVICE — SKIN AFFIX SURG ADHESIVE 72/CS 0.55ML: Brand: MEDLINE

## (undated) DEVICE — DRAPE,EXTREMITY,89X128,STERILE: Brand: MEDLINE

## (undated) DEVICE — GLV SURG SIGNATURE ESSENTIAL PF LTX SZ6.5

## (undated) DEVICE — PK MAJ PROC LF 60

## (undated) DEVICE — SUT VIC 3/0 SH 27IN J416H